# Patient Record
Sex: FEMALE | Race: WHITE | NOT HISPANIC OR LATINO
[De-identification: names, ages, dates, MRNs, and addresses within clinical notes are randomized per-mention and may not be internally consistent; named-entity substitution may affect disease eponyms.]

---

## 2017-01-09 ENCOUNTER — RX RENEWAL (OUTPATIENT)
Age: 27
End: 2017-01-09

## 2017-04-11 ENCOUNTER — APPOINTMENT (OUTPATIENT)
Dept: NEUROLOGY | Facility: CLINIC | Age: 27
End: 2017-04-11

## 2020-01-21 ENCOUNTER — EMERGENCY (EMERGENCY)
Facility: HOSPITAL | Age: 30
LOS: 0 days | Discharge: HOME | End: 2020-01-21
Attending: EMERGENCY MEDICINE
Payer: COMMERCIAL

## 2020-01-21 VITALS
DIASTOLIC BLOOD PRESSURE: 60 MMHG | HEART RATE: 100 BPM | RESPIRATION RATE: 18 BRPM | SYSTOLIC BLOOD PRESSURE: 99 MMHG | TEMPERATURE: 99 F | OXYGEN SATURATION: 100 %

## 2020-01-21 VITALS — WEIGHT: 199.96 LBS | HEIGHT: 67 IN

## 2020-01-21 DIAGNOSIS — R00.0 TACHYCARDIA, UNSPECIFIED: ICD-10-CM

## 2020-01-21 DIAGNOSIS — R50.9 FEVER, UNSPECIFIED: ICD-10-CM

## 2020-01-21 DIAGNOSIS — A08.4 VIRAL INTESTINAL INFECTION, UNSPECIFIED: ICD-10-CM

## 2020-01-21 DIAGNOSIS — R10.9 UNSPECIFIED ABDOMINAL PAIN: ICD-10-CM

## 2020-01-21 DIAGNOSIS — K40.90 UNILATERAL INGUINAL HERNIA, WITHOUT OBSTRUCTION OR GANGRENE, NOT SPECIFIED AS RECURRENT: ICD-10-CM

## 2020-01-21 DIAGNOSIS — R11.10 VOMITING, UNSPECIFIED: ICD-10-CM

## 2020-01-21 DIAGNOSIS — Z88.0 ALLERGY STATUS TO PENICILLIN: ICD-10-CM

## 2020-01-21 LAB
ALBUMIN SERPL ELPH-MCNC: 3.4 G/DL — SIGNIFICANT CHANGE UP (ref 3.3–5)
ALP SERPL-CCNC: 84 U/L — SIGNIFICANT CHANGE UP (ref 40–120)
ALT FLD-CCNC: 25 U/L — SIGNIFICANT CHANGE UP (ref 12–78)
ANION GAP SERPL CALC-SCNC: 7 MMOL/L — SIGNIFICANT CHANGE UP (ref 5–17)
APPEARANCE UR: CLEAR — SIGNIFICANT CHANGE UP
APTT BLD: 32 SEC — SIGNIFICANT CHANGE UP (ref 27.5–36.3)
AST SERPL-CCNC: 12 U/L — LOW (ref 15–37)
BASOPHILS # BLD AUTO: 0 K/UL — SIGNIFICANT CHANGE UP (ref 0–0.2)
BASOPHILS NFR BLD AUTO: 0 % — SIGNIFICANT CHANGE UP (ref 0–2)
BILIRUB SERPL-MCNC: 0.7 MG/DL — SIGNIFICANT CHANGE UP (ref 0.2–1.2)
BILIRUB UR-MCNC: NEGATIVE — SIGNIFICANT CHANGE UP
BUN SERPL-MCNC: 16 MG/DL — SIGNIFICANT CHANGE UP (ref 7–23)
CALCIUM SERPL-MCNC: 8.3 MG/DL — LOW (ref 8.5–10.1)
CHLORIDE SERPL-SCNC: 102 MMOL/L — SIGNIFICANT CHANGE UP (ref 96–108)
CO2 SERPL-SCNC: 27 MMOL/L — SIGNIFICANT CHANGE UP (ref 22–31)
COLOR SPEC: YELLOW — SIGNIFICANT CHANGE UP
CREAT SERPL-MCNC: 1.11 MG/DL — SIGNIFICANT CHANGE UP (ref 0.5–1.3)
DIFF PNL FLD: NEGATIVE — SIGNIFICANT CHANGE UP
EOSINOPHIL # BLD AUTO: 0 K/UL — SIGNIFICANT CHANGE UP (ref 0–0.5)
EOSINOPHIL NFR BLD AUTO: 0 % — SIGNIFICANT CHANGE UP (ref 0–6)
GLUCOSE SERPL-MCNC: 123 MG/DL — HIGH (ref 70–99)
GLUCOSE UR QL: NEGATIVE MG/DL — SIGNIFICANT CHANGE UP
HCG SERPL-ACNC: <1 MIU/ML — SIGNIFICANT CHANGE UP
HCT VFR BLD CALC: 38.2 % — SIGNIFICANT CHANGE UP (ref 34.5–45)
HGB BLD-MCNC: 12.5 G/DL — SIGNIFICANT CHANGE UP (ref 11.5–15.5)
INR BLD: 1.16 RATIO — SIGNIFICANT CHANGE UP (ref 0.88–1.16)
KETONES UR-MCNC: ABNORMAL
LACTATE SERPL-SCNC: 3.1 MMOL/L — HIGH (ref 0.7–2)
LEUKOCYTE ESTERASE UR-ACNC: NEGATIVE — SIGNIFICANT CHANGE UP
LYMPHOCYTES # BLD AUTO: 0.18 K/UL — LOW (ref 1–3.3)
LYMPHOCYTES # BLD AUTO: 2 % — LOW (ref 13–44)
MCHC RBC-ENTMCNC: 28.2 PG — SIGNIFICANT CHANGE UP (ref 27–34)
MCHC RBC-ENTMCNC: 32.7 GM/DL — SIGNIFICANT CHANGE UP (ref 32–36)
MCV RBC AUTO: 86.2 FL — SIGNIFICANT CHANGE UP (ref 80–100)
MONOCYTES # BLD AUTO: 0.09 K/UL — SIGNIFICANT CHANGE UP (ref 0–0.9)
MONOCYTES NFR BLD AUTO: 1 % — LOW (ref 2–14)
NEUTROPHILS # BLD AUTO: 8.59 K/UL — HIGH (ref 1.8–7.4)
NEUTROPHILS NFR BLD AUTO: 90 % — HIGH (ref 43–77)
NITRITE UR-MCNC: NEGATIVE — SIGNIFICANT CHANGE UP
NRBC # BLD: SIGNIFICANT CHANGE UP /100 WBCS (ref 0–0)
PH UR: 6 — SIGNIFICANT CHANGE UP (ref 5–8)
PLATELET # BLD AUTO: 159 K/UL — SIGNIFICANT CHANGE UP (ref 150–400)
POTASSIUM SERPL-MCNC: 3.7 MMOL/L — SIGNIFICANT CHANGE UP (ref 3.5–5.3)
POTASSIUM SERPL-SCNC: 3.7 MMOL/L — SIGNIFICANT CHANGE UP (ref 3.5–5.3)
PROT SERPL-MCNC: 7.4 GM/DL — SIGNIFICANT CHANGE UP (ref 6–8.3)
PROT UR-MCNC: 15 MG/DL
PROTHROM AB SERPL-ACNC: 12.9 SEC — SIGNIFICANT CHANGE UP (ref 10–12.9)
RBC # BLD: 4.43 M/UL — SIGNIFICANT CHANGE UP (ref 3.8–5.2)
RBC # FLD: 13 % — SIGNIFICANT CHANGE UP (ref 10.3–14.5)
SODIUM SERPL-SCNC: 136 MMOL/L — SIGNIFICANT CHANGE UP (ref 135–145)
SP GR SPEC: 1.02 — SIGNIFICANT CHANGE UP (ref 1.01–1.02)
UROBILINOGEN FLD QL: NEGATIVE MG/DL — SIGNIFICANT CHANGE UP
WBC # BLD: 8.95 K/UL — SIGNIFICANT CHANGE UP (ref 3.8–10.5)
WBC # FLD AUTO: 8.95 K/UL — SIGNIFICANT CHANGE UP (ref 3.8–10.5)

## 2020-01-21 PROCEDURE — 36415 COLL VENOUS BLD VENIPUNCTURE: CPT

## 2020-01-21 PROCEDURE — 71046 X-RAY EXAM CHEST 2 VIEWS: CPT

## 2020-01-21 PROCEDURE — 87086 URINE CULTURE/COLONY COUNT: CPT

## 2020-01-21 PROCEDURE — 81001 URINALYSIS AUTO W/SCOPE: CPT

## 2020-01-21 PROCEDURE — 99284 EMERGENCY DEPT VISIT MOD MDM: CPT | Mod: 25

## 2020-01-21 PROCEDURE — 71046 X-RAY EXAM CHEST 2 VIEWS: CPT | Mod: 26

## 2020-01-21 PROCEDURE — 93010 ELECTROCARDIOGRAM REPORT: CPT

## 2020-01-21 PROCEDURE — 74177 CT ABD & PELVIS W/CONTRAST: CPT | Mod: 26

## 2020-01-21 PROCEDURE — 85025 COMPLETE CBC W/AUTO DIFF WBC: CPT

## 2020-01-21 PROCEDURE — 99284 EMERGENCY DEPT VISIT MOD MDM: CPT

## 2020-01-21 PROCEDURE — 96374 THER/PROPH/DIAG INJ IV PUSH: CPT

## 2020-01-21 PROCEDURE — 83605 ASSAY OF LACTIC ACID: CPT

## 2020-01-21 PROCEDURE — 80053 COMPREHEN METABOLIC PANEL: CPT

## 2020-01-21 PROCEDURE — 85730 THROMBOPLASTIN TIME PARTIAL: CPT

## 2020-01-21 PROCEDURE — 96375 TX/PRO/DX INJ NEW DRUG ADDON: CPT

## 2020-01-21 PROCEDURE — 84702 CHORIONIC GONADOTROPIN TEST: CPT

## 2020-01-21 PROCEDURE — 93005 ELECTROCARDIOGRAM TRACING: CPT

## 2020-01-21 PROCEDURE — 74177 CT ABD & PELVIS W/CONTRAST: CPT

## 2020-01-21 PROCEDURE — 87040 BLOOD CULTURE FOR BACTERIA: CPT

## 2020-01-21 PROCEDURE — 85610 PROTHROMBIN TIME: CPT

## 2020-01-21 RX ORDER — ACETAMINOPHEN 500 MG
1000 TABLET ORAL ONCE
Refills: 0 | Status: COMPLETED | OUTPATIENT
Start: 2020-01-21 | End: 2020-01-21

## 2020-01-21 RX ORDER — SODIUM CHLORIDE 9 MG/ML
2500 INJECTION INTRAMUSCULAR; INTRAVENOUS; SUBCUTANEOUS ONCE
Refills: 0 | Status: COMPLETED | OUTPATIENT
Start: 2020-01-21 | End: 2020-01-21

## 2020-01-21 RX ORDER — KETOROLAC TROMETHAMINE 30 MG/ML
15 SYRINGE (ML) INJECTION ONCE
Refills: 0 | Status: DISCONTINUED | OUTPATIENT
Start: 2020-01-21 | End: 2020-01-21

## 2020-01-21 RX ORDER — ONDANSETRON 8 MG/1
4 TABLET, FILM COATED ORAL ONCE
Refills: 0 | Status: COMPLETED | OUTPATIENT
Start: 2020-01-21 | End: 2020-01-21

## 2020-01-21 RX ADMIN — Medication 15 MILLIGRAM(S): at 20:39

## 2020-01-21 RX ADMIN — Medication 1000 MILLIGRAM(S): at 20:06

## 2020-01-21 RX ADMIN — ONDANSETRON 4 MILLIGRAM(S): 8 TABLET, FILM COATED ORAL at 20:06

## 2020-01-21 RX ADMIN — SODIUM CHLORIDE 2500 MILLILITER(S): 9 INJECTION INTRAMUSCULAR; INTRAVENOUS; SUBCUTANEOUS at 19:44

## 2020-01-21 NOTE — ED ADULT NURSE NOTE - CHIEF COMPLAINT QUOTE
patient presents with RLQ pain and two episodes of vomiting that started today.  Was seen at  fever 103 sent to ER to t/o wil.  unable to take her lexapro today

## 2020-01-21 NOTE — ED STATDOCS - PHYSICAL EXAMINATION
*GEN: No acute distress, well appearing; A+O x3   *HEAD: Normocephalic, Atraumatic  *EYES/NOSE: PERRL & EOMI b/l  *THROAT: airway patent, moist mucous membranes  *NECK: Neck supple, no masses  *PULMONARY: CTA b/l, symmetric breath sounds.   *CARDIAC: s1s2, regular rhythm, no Murmur  *ABDOMEN:  Non Distended, soft, no guarding, no rebound, no masses +RLQ TTP   *BACK: no CVA tenderness, Normal  spine   *EXTREMITIES: symmetric pulses, 2+ dp & radial pulses, capillary refill < 2 seconds, no cyanosis, no edema   *SKIN: no rash or bruising   *NEUROLOGIC: alert, CN 2-12 intact, moves all 4 extremities, full active & passive ROM in all extremities, normal baseline gait  *PSYCH: insight and judgment nl, memory nl, affect nl, thought nl

## 2020-01-21 NOTE — ED ADULT TRIAGE NOTE - CHIEF COMPLAINT QUOTE
patient re admission for sw eval for placement patient presents with RLQ pain and two episodes of vomiting that started today.  Was seen at  fever 103 sent to ER to t/o wil.  unable to take her lexapro today

## 2020-01-21 NOTE — ED STATDOCS - CARE PROVIDER_API CALL
Adrienne More ()  Surgery  [address], [address]nd [place] [address]  Phone: [phone]  Fax: [phone]  Follow Up Time:     Abhi Chen (MD)  Internal Medicine  [address] [place] [address]  Phone: [phone]  Fax: [phone]  Follow Up Time:

## 2020-01-21 NOTE — ED STATDOCS - NS ED ROS FT
Review of Systems:  	•	CONSTITUTIONAL:  fever  	•	SKIN: no rash  	•	RESPIRATORY: no shortness of breath  	•	CARDIAC: no chest pain, no palpitations  	•	GI:   abd pain,  nausea,  vomiting, no diarrhea  	•	GENITO-URINARY:  no dysuria; no hematuria    	•	MUSCULOSKELETAL:  no back pain  	•	NEUROLOGIC: no weakness  	•	ALLERGY: no rhinitis  	•	PSYSCHIATRIC: no anxiety

## 2020-01-21 NOTE — ED ADULT NURSE NOTE - NSIMPLEMENTINTERV_GEN_ALL_ED
Implemented All Universal Safety Interventions:  [place] to call system. Call bell, personal items and telephone within reach. Instruct patient to call for assistance. Room bathroom lighting operational. Non-slip footwear when patient is off stretcher. Physically safe environment: no spills, clutter or unnecessary equipment. Stretcher in lowest position, wheels locked, appropriate side rails in place.

## 2020-01-21 NOTE — ED STATDOCS - CLINICAL SUMMARY MEDICAL DECISION MAKING FREE TEXT BOX
Symptoms are consistent with gastroenteritis, however due to RLQ TTP will get CT A/P to r/u appendicitis. Patient is appropriately tachycardiac due to her fever, do not suspect sepsis at initial assessment

## 2020-01-21 NOTE — ED STATDOCS - PROGRESS NOTE DETAILS
28 y/o Female presents to the ED with family.  C/o waking up today with nausea, Vomiting x 3, last episode was 11am.  Followed by bodyaches.  Pt went to Urgent Care and abdomen was tender on exam there.  Was referred to ED for further eval.  In intake, pt was well appearing, but Temp was 103.3, HR was 140.  Oropharynx pink s lesions.  CTA B. Tachycardia.  Abd: Active Bs x4, Soft, (+) RLQ  tenderness.  Labs/U/A/ CT ordered.  High dose IVF ordered. Initial Lactate as 3.1.  Pt has received 2 liters of IVF.  WBC not elevated, but left shift in Neutrophils.  Glucose 123, otherwise CMP WNL.  U/A with Trace ketones, mod epithelial cells.  Re-exam:  Abd: Soft, (+) RLQ tenderness.  IV Abx not ordered due to possible Viral Gastroenteritis source.  Georgina Alberto PA-C CT scan with normal appearing appendix tracking into inguinal hernia.  Othwerwise, CT WNL.  Re-examined pt.  Vitals improved.  Feeling much better per pt.  Abd: round, Active Bs x4, Soft, Neg bulge in groin area / RLQ.  Area soft, Neg tenderness to R inguinal canal. No masses that are not reducible.  Will refer to Surgery as outpt.  Neg evidence of incarceration or strangulation in R inguinal hernia currently.     U/A without evidence of infection.  Repeat Lactate was 1.  Will dc patient home.  F/U with PMD amnd surgery.  Cont. Motrin / tylenol, fluids, rest.  Georgina Alberto PA-C CT scan with normal appearing appendix tracking into inguinal hernia.  Othwerwise, CT WNL.  Re-examined pt.  Vitals improved.  Feeling much better per pt.  Abd: round, Active Bs x4, Soft, Neg bulge in groin area / RLQ.  Area soft, Neg tenderness to R inguinal canal. No masses that are not reducible.  Will refer to Surgery as outpt.  Neg evidence of incarceration or strangulation in R inguinal hernia currently.     U/A without evidence of infection.  Repeat Lactate was 1.  Flu swab was negative at  today.  Will dc patient home.  F/U with PMD and surgery.  Cont. Motrin / tylenol, fluids, rest.  Georgina Alberto PA-C

## 2020-01-21 NOTE — ED STATDOCS - PATIENT PORTAL LINK FT
You can access the FollowMyHealth Patient Portal offered by [facility] by registering at the following website: [email or web address]. By joining [employer]’s FollowMyHealth portal, you will also be able to view your health information using other applications (apps) compatible with our system.

## 2020-01-21 NOTE — ED STATDOCS - CARE PROVIDERS DIRECT ADDRESSES
[email or web address]

## 2020-01-21 NOTE — ED STATDOCS - ATTENDING CONTRIBUTION TO CARE
I, Jonh Campo MD,  performed the initial face to face bedside interview with this patient regarding history of present illness, review of symptoms and relevant past medical, social and family history.  I completed an independent physical examination.  I was the initial provider who evaluated this patient. I have signed out the follow up of any pending tests (i.e. labs, radiological studies) to the ACP.  I have communicated the patient’s plan of care and disposition with the ACP.

## 2020-01-21 NOTE — ED STATDOCS - OBJECTIVE STATEMENT
Pertinent HPI/PMH/PSH/FHx/SHx and Review of Systems contained within  HPI: 30 yo F p/w CC N/V. Patient reports that she woke up this morning and was vomiting that was shortly followed by diffuse pain. Patient was seen at  and told patient to come to the ED after she was found to be febrile, and RLQ TTP and wants to r/u appendicitis. States that she did not take anything for the pain this morning,   PMH/PSH relevant for:  ROS negative for: fever, Chest pain, SOB, diarrhea, dysuria    FamilyHx and SocialHx not otherwise contributory Pertinent HPI/PMH/PSH/FHx/SHx and Review of Systems contained within  HPI: 30 yo F p/w CC N/V. Patient reports that she woke up this morning and was vomiting that was shortly followed by diffuse whole body pain. Patient was seen at  and told patient to come to the ED after she was found to be febrile, had RLQ TTP only on palpation & to r/u appendicitis. States that she did not take anything for the pain this morning, & didn't have abdominal pain till palpation  PMH/PSH relevant for: none  ROS negative for: fever, Chest pain, SOB, diarrhea, dysuria    FamilyHx and SocialHx not otherwise contributory

## 2020-01-21 NOTE — ED STATDOCS - SECONDARY DIAGNOSIS.
Unilateral inguinal hernia without obstruction or gangrene, recurrence not specified Fever Tachycardia

## 2020-01-21 NOTE — ED STATDOCS - CARE PLAN
Principal Discharge DX:	Viral gastroenteritis Principal Discharge DX:	Viral gastroenteritis  Secondary Diagnosis:	Unilateral inguinal hernia without obstruction or gangrene, recurrence not specified Principal Discharge DX:	Viral gastroenteritis  Secondary Diagnosis:	Unilateral inguinal hernia without obstruction or gangrene, recurrence not specified  Secondary Diagnosis:	Fever  Secondary Diagnosis:	Tachycardia

## 2020-01-22 LAB
CULTURE RESULTS: SIGNIFICANT CHANGE UP
SPECIMEN SOURCE: SIGNIFICANT CHANGE UP

## 2020-01-27 LAB
CULTURE RESULTS: SIGNIFICANT CHANGE UP
CULTURE RESULTS: SIGNIFICANT CHANGE UP
SPECIMEN SOURCE: SIGNIFICANT CHANGE UP
SPECIMEN SOURCE: SIGNIFICANT CHANGE UP

## 2020-02-03 ENCOUNTER — APPOINTMENT (OUTPATIENT)
Dept: SURGERY | Facility: CLINIC | Age: 30
End: 2020-02-03
Payer: COMMERCIAL

## 2020-02-03 VITALS
TEMPERATURE: 97.6 F | DIASTOLIC BLOOD PRESSURE: 69 MMHG | WEIGHT: 199.44 LBS | HEIGHT: 67 IN | OXYGEN SATURATION: 100 % | SYSTOLIC BLOOD PRESSURE: 101 MMHG | BODY MASS INDEX: 31.3 KG/M2 | HEART RATE: 79 BPM

## 2020-02-03 PROCEDURE — 99202 OFFICE O/P NEW SF 15 MIN: CPT

## 2020-02-03 NOTE — PLAN
[FreeTextEntry1] : 30 yo female with right groin hernia. \par -Open right inguinal hernia with resorbable mesh. All risk, benefit, alternatives explained and the patient expressed full understanding.

## 2020-02-03 NOTE — PHYSICAL EXAM
[JVD] : no jugular venous distention  [Normal Breath Sounds] : Normal breath sounds [Normal Heart Sounds] : normal heart sounds [Abdominal Masses] : No abdominal masses [Abdomen Tenderness] : ~T ~M No abdominal tenderness [Tender] : was nontender [Enlarged] : not enlarged [No Rash or Lesion] : No rash or lesion [Alert] : alert [Oriented to Person] : oriented to person [Oriented to Place] : oriented to place [Oriented to Time] : oriented to time [Calm] : calm [de-identified] : normal [de-identified] : alert and oriented x 3 [de-identified] : soft, nt, nd [de-identified] : no large inguinal hernia on the right

## 2020-02-03 NOTE — HISTORY OF PRESENT ILLNESS
[de-identified] : 28 yo female who presents to the office for evaluation of right groin hernia. She was recently seen in the emergency room where she was evaluated for abdominal pain. She was diagnosed with right inguinal hernia. She states that she has been having right groin discomfort over the past few months. She denied any obstructive symptoms.

## 2020-05-27 ENCOUNTER — APPOINTMENT (OUTPATIENT)
Dept: SURGERY | Facility: HOSPITAL | Age: 30
End: 2020-05-27

## 2020-07-13 ENCOUNTER — RESULT REVIEW (OUTPATIENT)
Age: 30
End: 2020-07-13

## 2020-07-13 ENCOUNTER — APPOINTMENT (OUTPATIENT)
Dept: ULTRASOUND IMAGING | Facility: CLINIC | Age: 30
End: 2020-07-13
Payer: COMMERCIAL

## 2020-07-13 ENCOUNTER — OUTPATIENT (OUTPATIENT)
Dept: OUTPATIENT SERVICES | Facility: HOSPITAL | Age: 30
LOS: 1 days | End: 2020-07-13
Payer: COMMERCIAL

## 2020-07-13 DIAGNOSIS — Z00.8 ENCOUNTER FOR OTHER GENERAL EXAMINATION: ICD-10-CM

## 2020-07-13 PROCEDURE — 76882 US LMTD JT/FCL EVL NVASC XTR: CPT

## 2020-07-13 PROCEDURE — 76882 US LMTD JT/FCL EVL NVASC XTR: CPT | Mod: 26,LT

## 2020-07-27 ENCOUNTER — APPOINTMENT (OUTPATIENT)
Dept: SURGERY | Facility: CLINIC | Age: 30
End: 2020-07-27
Payer: COMMERCIAL

## 2020-07-27 ENCOUNTER — APPOINTMENT (OUTPATIENT)
Dept: OBGYN | Facility: CLINIC | Age: 30
End: 2020-07-27
Payer: COMMERCIAL

## 2020-07-27 ENCOUNTER — TRANSCRIPTION ENCOUNTER (OUTPATIENT)
Age: 30
End: 2020-07-27

## 2020-07-27 VITALS
WEIGHT: 200 LBS | BODY MASS INDEX: 31.39 KG/M2 | SYSTOLIC BLOOD PRESSURE: 100 MMHG | HEIGHT: 67 IN | HEART RATE: 79 BPM | DIASTOLIC BLOOD PRESSURE: 70 MMHG | OXYGEN SATURATION: 98 % | TEMPERATURE: 97.2 F

## 2020-07-27 VITALS
HEIGHT: 67 IN | OXYGEN SATURATION: 98 % | DIASTOLIC BLOOD PRESSURE: 68 MMHG | TEMPERATURE: 97.7 F | WEIGHT: 200 LBS | BODY MASS INDEX: 31.39 KG/M2 | HEART RATE: 110 BPM | SYSTOLIC BLOOD PRESSURE: 96 MMHG

## 2020-07-27 DIAGNOSIS — Z83.3 FAMILY HISTORY OF DIABETES MELLITUS: ICD-10-CM

## 2020-07-27 DIAGNOSIS — N92.0 EXCESSIVE AND FREQUENT MENSTRUATION WITH REGULAR CYCLE: ICD-10-CM

## 2020-07-27 DIAGNOSIS — Z78.9 OTHER SPECIFIED HEALTH STATUS: ICD-10-CM

## 2020-07-27 DIAGNOSIS — Z82.49 FAMILY HISTORY OF ISCHEMIC HEART DISEASE AND OTHER DISEASES OF THE CIRCULATORY SYSTEM: ICD-10-CM

## 2020-07-27 PROCEDURE — 99203 OFFICE O/P NEW LOW 30 MIN: CPT

## 2020-07-27 PROCEDURE — 99211 OFF/OP EST MAY X REQ PHY/QHP: CPT

## 2020-07-27 NOTE — HISTORY OF PRESENT ILLNESS
[de-identified] : This is a known patient to me who has right inguinal hernia who presents for evaluation of left sided groin pain. She called few weeks ago stating that the pain she had on the left was similar to the one she had on her right side and we worked up the area with ultrasound. The US revealed no evidence of hernia. On exam, there is no evidence of hernia as well.  I advised the patient that there is no  hernia on the left and that since the right side is not bothering  her, we can observe. If at any point, the hernia gets symptomatic, we will need to proceed with repair of the hernia. All questions were answered in detail and the patient expressed full understanding.

## 2020-07-27 NOTE — COUNSELING
[Breast Self Exam] : breast self exam [Nutrition] : nutrition [Exercise] : exercise [Vitamins/Supplements] : vitamins/supplements [Preconception Care] : preconception care [STD (testing, results, tx)] : STD (testing, results, tx) [Medication Management] : medication management [Pain Management] : pain management

## 2020-07-27 NOTE — CHIEF COMPLAINT
[Initial Visit] : initial GYN visit [FreeTextEntry1] : Pt here today bec a TVS reported poss PCOS---pt has no clinical history to suggest this. Menses monthly and always regular..Severe LLQ pain 2 wks prior to menses x1--spont resolved. CT nl gyn findings. Pt does suffer with excessive menses and dysmen that improves with OCP but interested in conceiving ,but stopped 2 yrs ago and not actively trying\par pap 2019\par pt seen by gyn at [facility] and here today for 2nd opinion

## 2020-08-19 ENCOUNTER — APPOINTMENT (OUTPATIENT)
Dept: OBGYN | Facility: CLINIC | Age: 30
End: 2020-08-19

## 2020-11-05 ENCOUNTER — APPOINTMENT (OUTPATIENT)
Dept: SURGERY | Facility: CLINIC | Age: 30
End: 2020-11-05
Payer: COMMERCIAL

## 2020-11-05 VITALS — SYSTOLIC BLOOD PRESSURE: 121 MMHG | OXYGEN SATURATION: 97 % | HEART RATE: 91 BPM | DIASTOLIC BLOOD PRESSURE: 78 MMHG

## 2020-11-05 VITALS — BODY MASS INDEX: 32.33 KG/M2 | WEIGHT: 206 LBS | TEMPERATURE: 97.8 F | HEIGHT: 67 IN

## 2020-11-05 PROCEDURE — 99072 ADDL SUPL MATRL&STAF TM PHE: CPT

## 2020-11-05 PROCEDURE — 99211 OFF/OP EST MAY X REQ PHY/QHP: CPT

## 2020-11-05 NOTE — HISTORY OF PRESENT ILLNESS
[de-identified] : The patient was seen and examined. She is for repair of right inguinal hernia. She had some question about repair approach and wanted to discuss before we proceeded. She will like to undergo the repair with Phasix mesh. The patient is agreeable to proceed with the procedure. All questions were answered and the patient expressed full understanding.

## 2020-11-11 ENCOUNTER — OUTPATIENT (OUTPATIENT)
Dept: OUTPATIENT SERVICES | Facility: HOSPITAL | Age: 30
LOS: 1 days | Discharge: HOME | End: 2020-11-11

## 2020-11-11 VITALS
DIASTOLIC BLOOD PRESSURE: 64 MMHG | TEMPERATURE: 98 F | HEIGHT: 67 IN | WEIGHT: 202.83 LBS | HEART RATE: 77 BPM | RESPIRATION RATE: 18 BRPM | OXYGEN SATURATION: 99 % | SYSTOLIC BLOOD PRESSURE: 102 MMHG

## 2020-11-11 DIAGNOSIS — Z01.818 ENCOUNTER FOR OTHER PREPROCEDURAL EXAMINATION: ICD-10-CM

## 2020-11-11 DIAGNOSIS — K40.90 UNILATERAL INGUINAL HERNIA, WITHOUT OBSTRUCTION OR GANGRENE, NOT SPECIFIED AS RECURRENT: ICD-10-CM

## 2020-11-11 DIAGNOSIS — Z87.19 PERSONAL HISTORY OF OTHER DISEASES OF THE DIGESTIVE SYSTEM: ICD-10-CM

## 2020-11-11 DIAGNOSIS — E28.2 POLYCYSTIC OVARIAN SYNDROME: ICD-10-CM

## 2020-11-11 LAB
ANION GAP SERPL CALC-SCNC: 5 MMOL/L — SIGNIFICANT CHANGE UP (ref 5–17)
APTT BLD: 35.6 SEC — HIGH (ref 27.5–35.5)
BUN SERPL-MCNC: 13 MG/DL — SIGNIFICANT CHANGE UP (ref 7–23)
CALCIUM SERPL-MCNC: 9.3 MG/DL — SIGNIFICANT CHANGE UP (ref 8.5–10.1)
CHLORIDE SERPL-SCNC: 107 MMOL/L — SIGNIFICANT CHANGE UP (ref 96–108)
CO2 SERPL-SCNC: 27 MMOL/L — SIGNIFICANT CHANGE UP (ref 22–31)
CREAT SERPL-MCNC: 0.79 MG/DL — SIGNIFICANT CHANGE UP (ref 0.5–1.3)
GLUCOSE SERPL-MCNC: 87 MG/DL — SIGNIFICANT CHANGE UP (ref 70–99)
HCG UR QL: NEGATIVE — SIGNIFICANT CHANGE UP
HCT VFR BLD CALC: 38.4 % — SIGNIFICANT CHANGE UP (ref 34.5–45)
HGB BLD-MCNC: 12 G/DL — SIGNIFICANT CHANGE UP (ref 11.5–15.5)
INR BLD: 0.99 RATIO — SIGNIFICANT CHANGE UP (ref 0.88–1.16)
MCHC RBC-ENTMCNC: 26.5 PG — LOW (ref 27–34)
MCHC RBC-ENTMCNC: 31.3 GM/DL — LOW (ref 32–36)
MCV RBC AUTO: 85 FL — SIGNIFICANT CHANGE UP (ref 80–100)
NRBC # BLD: 0 /100 WBCS — SIGNIFICANT CHANGE UP (ref 0–0)
PLATELET # BLD AUTO: 224 K/UL — SIGNIFICANT CHANGE UP (ref 150–400)
POTASSIUM SERPL-MCNC: 4.1 MMOL/L — SIGNIFICANT CHANGE UP (ref 3.5–5.3)
POTASSIUM SERPL-SCNC: 4.1 MMOL/L — SIGNIFICANT CHANGE UP (ref 3.5–5.3)
PROTHROM AB SERPL-ACNC: 11.5 SEC — SIGNIFICANT CHANGE UP (ref 10.6–13.6)
RBC # BLD: 4.52 M/UL — SIGNIFICANT CHANGE UP (ref 3.8–5.2)
RBC # FLD: 13.6 % — SIGNIFICANT CHANGE UP (ref 10.3–14.5)
SODIUM SERPL-SCNC: 139 MMOL/L — SIGNIFICANT CHANGE UP (ref 135–145)
WBC # BLD: 6.55 K/UL — SIGNIFICANT CHANGE UP (ref 3.8–10.5)
WBC # FLD AUTO: 6.55 K/UL — SIGNIFICANT CHANGE UP (ref 3.8–10.5)

## 2020-11-11 NOTE — H&P PST ADULT - NSANTHOSAYNRD_GEN_A_CORE
No. CAMILLE screening performed.  STOP BANG Legend: 0-2 = LOW Risk; 3-4 = INTERMEDIATE Risk; 5-8 = HIGH Risk

## 2020-11-11 NOTE — H&P PST ADULT - HISTORY OF PRESENT ILLNESS
30F pmh PCOS, IBS c/o right abdominal pain found to have inguinal hernia here for PST for scheduled Laparoscopic right inguinal hernia repair with mesh possible open  This patient denies any fever, cough, sob, flu like symptoms or travel outside of the [place] in the past 30 days

## 2020-11-11 NOTE — H&P PST ADULT - NSICDXPROBLEM_GEN_ALL_CORE_FT
PROBLEM DIAGNOSES  Problem: History of IBS  Assessment and Plan: Continue current regimen     Problem: PCOS (polycystic ovarian syndrome)  Assessment and Plan: Continue current regimen and medications.

## 2020-11-11 NOTE — H&P PST ADULT - ASSESSMENT
30F pmh PCOS, IBS c/o right abdominal pain found to have inguinal hernia here for PST for scheduled Laparoscopic right inguinal hernia repair with mesh possible open  CAPRINI SCORE    AGE RELATED RISK FACTORS                                                       MOBILITY RELATED FACTORS  [ ] Age 41-60 years                                            (1 Point)                  [ ] Bed rest                                                        (1 Point)  [ ] Age: 61-74 years                                           (2 Points)                [ ] Plaster cast                                                   (2 Points)  [ ] Age= 75 years                                              (3 Points)                 [ ] Bed bound for more than 72 hours                   (2 Points)    DISEASE RELATED RISK FACTORS                                               GENDER SPECIFIC FACTORS  [ ] Edema in the lower extremities                       (1 Point)                  [ ] Pregnancy                                                     (1 Point)  [ ] Varicose veins                                               (1 Point)                  [ ] Post-partum < 6 weeks                                   (1 Point)             [x ] BMI > 25 Kg/m2                                            (1 Point)                  [ ] Hormonal therapy  or oral contraception            (1 Point)                 [ ] Sepsis (in the previous month)                        (1 Point)                  [ ] History of pregnancy complications  [ ] Pneumonia or serious lung disease                                               [ ] Unexplained or recurrent                       (1 Point)           (in the previous month)                               (1 Point)  [ ] Abnormal pulmonary function test                     (1 Point)                 SURGERY RELATED RISK FACTORS  [ ] Acute myocardial infarction                              (1 Point)                 [ ]  Section                                            (1 Point)  [ ] Congestive heart failure (in the previous month)  (1 Point)                 [ ] Minor surgery                                                 (1 Point)   [ ] Inflammatory bowel disease                             (1 Point)                 [ ] Arthroscopic surgery                                        (2 Points)  [ ] Central venous access                                    (2 Points)                [x ] General surgery lasting more than 45 minutes   (2 Points)       [ ] Stroke (in the previous month)                          (5 Points)               [ ] Elective arthroplasty                                        (5 Points)                                                                                                                                               HEMATOLOGY RELATED FACTORS                                                 TRAUMA RELATED RISK FACTORS  [ ] Prior episodes of VTE                                     (3 Points)                 [ ] Fracture of the hip, pelvis, or leg                       (5 Points)  [ ] Positive family history for VTE                         (3 Points)                 [ ] Acute spinal cord injury (in the previous month)  (5 Points)  [ ] Prothrombin 60705 A                                      (3 Points)                 [ ] Paralysis  (less than 1 month)                          (5 Points)  [ ] Factor V Leiden                                             (3 Points)                 [ ] Multiple Trauma within 1 month                         (5 Points)  [ ] Lupus anticoagulants                                     (3 Points)                                                           [ ] Anticardiolipin antibodies                                (3 Points)                                                       [ ] High homocysteine in the blood                      (3 Points)                                             [ ] Other congenital or acquired thrombophilia       (3 Points)                                                [ ] Heparin induced thrombocytopenia                  (3 Points)                                          Total Score [      3    ]

## 2020-11-12 PROBLEM — Z87.19 PERSONAL HISTORY OF OTHER DISEASES OF THE DIGESTIVE SYSTEM: Chronic | Status: ACTIVE | Noted: 2020-11-11

## 2020-11-12 PROBLEM — Z87.42 PERSONAL HISTORY OF OTHER DISEASES OF THE FEMALE GENITAL TRACT: Chronic | Status: ACTIVE | Noted: 2020-11-11

## 2020-11-22 ENCOUNTER — APPOINTMENT (OUTPATIENT)
Dept: DISASTER EMERGENCY | Facility: CLINIC | Age: 30
End: 2020-11-22

## 2020-11-23 LAB — SARS-COV-2 N GENE NPH QL NAA+PROBE: NOT DETECTED

## 2020-11-24 ENCOUNTER — TRANSCRIPTION ENCOUNTER (OUTPATIENT)
Age: 30
End: 2020-11-24

## 2020-11-25 ENCOUNTER — OUTPATIENT (OUTPATIENT)
Dept: OUTPATIENT SERVICES | Facility: HOSPITAL | Age: 30
LOS: 1 days | Discharge: HOME | End: 2020-11-25
Payer: COMMERCIAL

## 2020-11-25 ENCOUNTER — APPOINTMENT (OUTPATIENT)
Dept: SURGERY | Facility: HOSPITAL | Age: 30
End: 2020-11-25

## 2020-11-25 VITALS
SYSTOLIC BLOOD PRESSURE: 107 MMHG | HEART RATE: 57 BPM | OXYGEN SATURATION: 100 % | DIASTOLIC BLOOD PRESSURE: 51 MMHG | RESPIRATION RATE: 13 BRPM

## 2020-11-25 VITALS
WEIGHT: 199.96 LBS | HEIGHT: 67 IN | TEMPERATURE: 98 F | OXYGEN SATURATION: 98 % | DIASTOLIC BLOOD PRESSURE: 64 MMHG | HEART RATE: 82 BPM | SYSTOLIC BLOOD PRESSURE: 103 MMHG | RESPIRATION RATE: 16 BRPM

## 2020-11-25 DIAGNOSIS — K40.90 UNILATERAL INGUINAL HERNIA, WITHOUT OBSTRUCTION OR GANGRENE, NOT SPECIFIED AS RECURRENT: ICD-10-CM

## 2020-11-25 LAB
GLUCOSE BLDC GLUCOMTR-MCNC: 100 MG/DL — HIGH (ref 70–99)
HCG UR QL: NEGATIVE — SIGNIFICANT CHANGE UP

## 2020-11-25 PROCEDURE — 49505 PRP I/HERN INIT REDUC >5 YR: CPT

## 2020-11-25 RX ORDER — FENTANYL CITRATE 50 UG/ML
50 INJECTION INTRAVENOUS ONCE
Refills: 0 | Status: DISCONTINUED | OUTPATIENT
Start: 2020-11-25 | End: 2020-11-26

## 2020-11-25 RX ORDER — ACETAMINOPHEN 500 MG
1000 TABLET ORAL ONCE
Refills: 0 | Status: COMPLETED | OUTPATIENT
Start: 2020-11-25 | End: 2020-11-25

## 2020-11-25 RX ORDER — FENTANYL CITRATE 50 UG/ML
25 INJECTION INTRAVENOUS
Refills: 0 | Status: DISCONTINUED | OUTPATIENT
Start: 2020-11-25 | End: 2020-11-26

## 2020-11-25 RX ORDER — OXYCODONE HYDROCHLORIDE 5 MG/1
1 TABLET ORAL
Qty: 8 | Refills: 0
Start: 2020-11-25

## 2020-11-25 RX ORDER — SODIUM CHLORIDE 9 MG/ML
3 INJECTION INTRAMUSCULAR; INTRAVENOUS; SUBCUTANEOUS EVERY 8 HOURS
Refills: 0 | Status: DISCONTINUED | OUTPATIENT
Start: 2020-11-25 | End: 2020-11-25

## 2020-11-25 RX ORDER — ONDANSETRON 8 MG/1
4 TABLET, FILM COATED ORAL ONCE
Refills: 0 | Status: DISCONTINUED | OUTPATIENT
Start: 2020-11-25 | End: 2020-11-26

## 2020-11-25 RX ORDER — SODIUM CHLORIDE 9 MG/ML
1000 INJECTION, SOLUTION INTRAVENOUS
Refills: 0 | Status: DISCONTINUED | OUTPATIENT
Start: 2020-11-25 | End: 2020-11-26

## 2020-11-25 RX ADMIN — Medication 400 MILLIGRAM(S): at 12:52

## 2020-11-25 RX ADMIN — SODIUM CHLORIDE 100 MILLILITER(S): 9 INJECTION, SOLUTION INTRAVENOUS at 12:53

## 2020-11-25 NOTE — ASU DISCHARGE PLAN (ADULT/PEDIATRIC) - CARE PROVIDER_API CALL
Jojo Kinney  SURGERY  [address] [place] [address]  Phone: [phone]  Fax: [phone]  Follow Up Time:

## 2020-11-25 NOTE — ASU DISCHARGE PLAN (ADULT/PEDIATRIC) - ASU DC SPECIAL INSTRUCTIONSFT
Follow up with Dr. Kinney in 1-2 weeks. Please call to schedule an appointment.   NOTIFY YOUR SURGEON IF: You have any bleeding that does not stop, any pus draining from your wound, any fever (over 100.4 F) or chills, persistent nausea/vomiting, persistent diarrhea, or if your pain is not controlled on your discharge pain medications.    Wound: You may take off outer pink dressing and shower after 48 hours. After showering, pat steri strips dry. Leave the white steri strips in place, they will fall off on their own in approximately 5-7 days or they will be removed at your follow up appointment.

## 2020-11-25 NOTE — BRIEF OPERATIVE NOTE - NSICDXBRIEFPROCEDURE_GEN_ALL_CORE_FT
PROCEDURES:  Open repair of inguinal hernia using mesh in adult 25-Nov-2020 11:52:06  Eligio Montalvo

## 2020-12-01 DIAGNOSIS — K40.90 UNILATERAL INGUINAL HERNIA, WITHOUT OBSTRUCTION OR GANGRENE, NOT SPECIFIED AS RECURRENT: ICD-10-CM

## 2020-12-01 DIAGNOSIS — E28.2 POLYCYSTIC OVARIAN SYNDROME: ICD-10-CM

## 2020-12-01 DIAGNOSIS — Z88.0 ALLERGY STATUS TO PENICILLIN: ICD-10-CM

## 2020-12-01 DIAGNOSIS — Z79.84 LONG TERM (CURRENT) USE OF ORAL HYPOGLYCEMIC DRUGS: ICD-10-CM

## 2020-12-03 ENCOUNTER — APPOINTMENT (OUTPATIENT)
Dept: SURGERY | Facility: CLINIC | Age: 30
End: 2020-12-03
Payer: COMMERCIAL

## 2020-12-03 VITALS
SYSTOLIC BLOOD PRESSURE: 114 MMHG | HEIGHT: 67 IN | OXYGEN SATURATION: 99 % | DIASTOLIC BLOOD PRESSURE: 77 MMHG | WEIGHT: 197.19 LBS | BODY MASS INDEX: 30.95 KG/M2 | TEMPERATURE: 97.5 F | HEART RATE: 85 BPM

## 2020-12-03 PROCEDURE — 99024 POSTOP FOLLOW-UP VISIT: CPT

## 2020-12-03 NOTE — HISTORY OF PRESENT ILLNESS
[de-identified] : Pt was seen and examined. Pt is s/p repair of repair of right inguinal hernia with mesh. She states that her pain is better and the wound feels well. She was advised to keep the area clean and dry.

## 2021-01-14 ENCOUNTER — APPOINTMENT (OUTPATIENT)
Dept: SURGERY | Facility: CLINIC | Age: 31
End: 2021-01-14

## 2022-01-19 ENCOUNTER — OUTPATIENT (OUTPATIENT)
Dept: INPATIENT UNIT | Facility: HOSPITAL | Age: 32
LOS: 1 days | Discharge: HOME | End: 2022-01-19
Payer: COMMERCIAL

## 2022-01-19 DIAGNOSIS — O26.899 OTHER SPECIFIED PREGNANCY RELATED CONDITIONS, UNSPECIFIED TRIMESTER: ICD-10-CM

## 2022-01-19 PROCEDURE — G0463: CPT

## 2022-01-19 PROCEDURE — 59025 FETAL NON-STRESS TEST: CPT

## 2022-01-20 DIAGNOSIS — O26.899 OTHER SPECIFIED PREGNANCY RELATED CONDITIONS, UNSPECIFIED TRIMESTER: ICD-10-CM

## 2022-01-20 DIAGNOSIS — Z3A.00 WEEKS OF GESTATION OF PREGNANCY NOT SPECIFIED: ICD-10-CM

## 2022-01-24 ENCOUNTER — INPATIENT (INPATIENT)
Facility: HOSPITAL | Age: 32
LOS: 2 days | Discharge: HOME | End: 2022-01-27
Attending: OBSTETRICS & GYNECOLOGY | Admitting: OBSTETRICS & GYNECOLOGY
Payer: COMMERCIAL

## 2022-01-24 VITALS — HEIGHT: 67 IN | WEIGHT: 220.9 LBS

## 2022-01-24 DIAGNOSIS — E03.9 HYPOTHYROIDISM, UNSPECIFIED: ICD-10-CM

## 2022-01-24 DIAGNOSIS — E28.2 POLYCYSTIC OVARIAN SYNDROME: ICD-10-CM

## 2022-01-24 DIAGNOSIS — O32.4XX0 MATERNAL CARE FOR HIGH HEAD AT TERM, NOT APPLICABLE OR UNSPECIFIED: ICD-10-CM

## 2022-01-24 DIAGNOSIS — Z3A.39 39 WEEKS GESTATION OF PREGNANCY: ICD-10-CM

## 2022-01-24 DIAGNOSIS — D62 ACUTE POSTHEMORRHAGIC ANEMIA: ICD-10-CM

## 2022-01-24 DIAGNOSIS — O42.92 FULL-TERM PREMATURE RUPTURE OF MEMBRANES, UNSPECIFIED AS TO LENGTH OF TIME BETWEEN RUPTURE AND ONSET OF LABOR: ICD-10-CM

## 2022-01-24 LAB
BASOPHILS # BLD AUTO: 0.03 K/UL — SIGNIFICANT CHANGE UP (ref 0–0.2)
BASOPHILS NFR BLD AUTO: 0.3 % — SIGNIFICANT CHANGE UP (ref 0–2)
COVID-19 SPIKE DOMAIN AB INTERP: POSITIVE
COVID-19 SPIKE DOMAIN ANTIBODY RESULT: >250 U/ML — HIGH
EOSINOPHIL # BLD AUTO: 0.03 K/UL — SIGNIFICANT CHANGE UP (ref 0–0.5)
EOSINOPHIL NFR BLD AUTO: 0.3 % — SIGNIFICANT CHANGE UP (ref 0–6)
HCT VFR BLD CALC: 35.1 % — SIGNIFICANT CHANGE UP (ref 34.5–45)
HGB BLD-MCNC: 11.6 G/DL — SIGNIFICANT CHANGE UP (ref 11.5–15.5)
IMM GRANULOCYTES NFR BLD AUTO: 0.9 % — SIGNIFICANT CHANGE UP (ref 0–1.5)
LYMPHOCYTES # BLD AUTO: 1.58 K/UL — SIGNIFICANT CHANGE UP (ref 1–3.3)
LYMPHOCYTES # BLD AUTO: 14.3 % — SIGNIFICANT CHANGE UP (ref 13–44)
MCHC RBC-ENTMCNC: 30.2 PG — SIGNIFICANT CHANGE UP (ref 27–34)
MCHC RBC-ENTMCNC: 33 GM/DL — SIGNIFICANT CHANGE UP (ref 32–36)
MCV RBC AUTO: 91.4 FL — SIGNIFICANT CHANGE UP (ref 80–100)
MONOCYTES # BLD AUTO: 0.62 K/UL — SIGNIFICANT CHANGE UP (ref 0–0.9)
MONOCYTES NFR BLD AUTO: 5.6 % — SIGNIFICANT CHANGE UP (ref 2–14)
NEUTROPHILS # BLD AUTO: 8.72 K/UL — HIGH (ref 1.8–7.4)
NEUTROPHILS NFR BLD AUTO: 78.6 % — HIGH (ref 43–77)
PLATELET # BLD AUTO: 147 K/UL — LOW (ref 150–400)
RBC # BLD: 3.84 M/UL — SIGNIFICANT CHANGE UP (ref 3.8–5.2)
RBC # FLD: 13.7 % — SIGNIFICANT CHANGE UP (ref 10.3–14.5)
SARS-COV-2 IGG+IGM SERPL QL IA: >250 U/ML — HIGH
SARS-COV-2 IGG+IGM SERPL QL IA: POSITIVE
SARS-COV-2 RNA SPEC QL NAA+PROBE: SIGNIFICANT CHANGE UP
T PALLIDUM AB TITR SER: NEGATIVE — SIGNIFICANT CHANGE UP
WBC # BLD: 11.08 K/UL — HIGH (ref 3.8–10.5)
WBC # FLD AUTO: 11.08 K/UL — HIGH (ref 3.8–10.5)

## 2022-01-24 PROCEDURE — 85014 HEMATOCRIT: CPT

## 2022-01-24 PROCEDURE — 86901 BLOOD TYPING SEROLOGIC RH(D): CPT

## 2022-01-24 PROCEDURE — 86900 BLOOD TYPING SEROLOGIC ABO: CPT

## 2022-01-24 PROCEDURE — 86780 TREPONEMA PALLIDUM: CPT

## 2022-01-24 PROCEDURE — 94760 N-INVAS EAR/PLS OXIMETRY 1: CPT

## 2022-01-24 PROCEDURE — G0463: CPT

## 2022-01-24 PROCEDURE — 85025 COMPLETE CBC W/AUTO DIFF WBC: CPT

## 2022-01-24 PROCEDURE — 59050 FETAL MONITOR W/REPORT: CPT

## 2022-01-24 PROCEDURE — 86850 RBC ANTIBODY SCREEN: CPT

## 2022-01-24 PROCEDURE — U0005: CPT

## 2022-01-24 PROCEDURE — 85018 HEMOGLOBIN: CPT

## 2022-01-24 PROCEDURE — 36415 COLL VENOUS BLD VENIPUNCTURE: CPT

## 2022-01-24 PROCEDURE — 86769 SARS-COV-2 COVID-19 ANTIBODY: CPT

## 2022-01-24 PROCEDURE — U0003: CPT

## 2022-01-24 PROCEDURE — C1889: CPT

## 2022-01-24 RX ORDER — SODIUM CHLORIDE 9 MG/ML
1000 INJECTION, SOLUTION INTRAVENOUS
Refills: 0 | Status: DISCONTINUED | OUTPATIENT
Start: 2022-01-24 | End: 2022-01-25

## 2022-01-24 RX ORDER — AMPICILLIN TRIHYDRATE 250 MG
2 CAPSULE ORAL ONCE
Refills: 0 | Status: COMPLETED | OUTPATIENT
Start: 2022-01-24 | End: 2022-01-24

## 2022-01-24 RX ORDER — CITRIC ACID/SODIUM CITRATE 300-500 MG
15 SOLUTION, ORAL ORAL EVERY 6 HOURS
Refills: 0 | Status: DISCONTINUED | OUTPATIENT
Start: 2022-01-24 | End: 2022-01-25

## 2022-01-24 RX ORDER — OXYTOCIN 10 UNIT/ML
VIAL (ML) INJECTION
Qty: 20 | Refills: 0 | Status: DISCONTINUED | OUTPATIENT
Start: 2022-01-24 | End: 2022-01-27

## 2022-01-24 RX ORDER — AMPICILLIN TRIHYDRATE 250 MG
1 CAPSULE ORAL EVERY 4 HOURS
Refills: 0 | Status: DISCONTINUED | OUTPATIENT
Start: 2022-01-24 | End: 2022-01-25

## 2022-01-24 RX ADMIN — Medication 216 GRAM(S): at 13:08

## 2022-01-24 RX ADMIN — Medication 108 GRAM(S): at 21:05

## 2022-01-24 RX ADMIN — SODIUM CHLORIDE 125 MILLILITER(S): 9 INJECTION, SOLUTION INTRAVENOUS at 20:15

## 2022-01-24 RX ADMIN — Medication 108 GRAM(S): at 17:04

## 2022-01-24 RX ADMIN — SODIUM CHLORIDE 125 MILLILITER(S): 9 INJECTION, SOLUTION INTRAVENOUS at 13:07

## 2022-01-24 NOTE — PATIENT PROFILE OB - DOES PATIENT HAVE ADVANCE DIRECTIVE
Subjective:       Patient ID: Jason Hatchett is a 42 y.o. male.    Chief Complaint: Annual Exam    Here for routine health maintenance.    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  Benadryl nw;   Elevated lft - needs recheck and additional w/u      Review of Systems   Constitutional: Negative for activity change, appetite change, fever and unexpected weight change.   HENT: Negative for hearing loss, nosebleeds, rhinorrhea and trouble swallowing.    Eyes: Negative for discharge and visual disturbance.   Respiratory: Negative for choking, chest tightness, shortness of breath and wheezing.    Cardiovascular: Negative for chest pain and palpitations.   Gastrointestinal: Negative for abdominal pain, blood in stool, constipation, diarrhea, nausea and vomiting.   Endocrine: Negative for polydipsia and polyuria.   Genitourinary: Negative for difficulty urinating, hematuria and urgency.   Musculoskeletal: Positive for arthralgias. Negative for joint swelling and neck pain.   Skin: Negative for rash and wound.   Neurological: Negative for dizziness, syncope, weakness and headaches.   Psychiatric/Behavioral: Negative for confusion and dysphoric mood.       Objective:      Vitals:    05/01/18 1333   BP: 130/78   Pulse: 71   Temp: 98.8 °F (37.1 °C)     Physical Exam   Constitutional: He appears well-nourished.   Eyes: Conjunctivae and EOM are normal.   Neck: Trachea normal and normal range of motion. No thyromegaly present.   Cardiovascular: Normal heart sounds.    Edema negative   Pulmonary/Chest: Effort normal and breath sounds normal.   Abdominal: Soft. There is no hepatomegaly.   Musculoskeletal:   ROM normal bilateral  Strength normal bilateral   Neurological: He has normal reflexes. No cranial nerve deficit.   Skin: Skin is warm, dry and intact.   Psychiatric: He has a normal mood and affect.   Alert  and Oriented    Vitals reviewed.        Assessment:       1. Routine physical examination    2. Sleep disorder, shift work    3. Insomnia, unspecified type    4. Essential hypertension    5. Transaminitis    6. Lateral epicondylitis of both elbows        Plan:       Routine physical examination  -     TSH; Future; Expected date: 05/01/2018  -     Comprehensive metabolic panel; Future; Expected date: 05/01/2018  -     Lipid panel; Future; Expected date: 05/01/2018    Sleep disorder, shift work  -     dextroamphetamine-amphetamine (ADDERALL) 20 mg tablet; Take 1 tablet (20 mg total) by mouth once daily. As needed For sleep shift work disorder  Dispense: 60 tablet; Refill: 0    Insomnia, unspecified type    Essential hypertension  -     Lipid panel; Future; Expected date: 05/01/2018  -     lisinopril (PRINIVIL,ZESTRIL) 20 MG tablet; Take 1 tablet (20 mg total) by mouth once daily.  Dispense: 30 tablet; Refill: 11    Transaminitis  -     Ferritin; Future; Expected date: 05/01/2018  -     Iron and TIBC; Future; Expected date: 05/01/2018  -     TSH; Future; Expected date: 05/01/2018  -     Gamma GT; Future; Expected date: 05/01/2018  -     Comprehensive metabolic panel; Future; Expected date: 05/01/2018    Lateral epicondylitis of both elbows            Medication List with Changes/Refills   New Medications    DEXTROAMPHETAMINE-AMPHETAMINE (ADDERALL) 20 MG TABLET    Take 1 tablet (20 mg total) by mouth once daily. As needed For sleep shift work disorder   Current Medications    FLUTICASONE (FLONASE) 50 MCG/ACTUATION NASAL SPRAY    SPRAY 2 SPRAYS IN EACH NOSTRIL DAILY    HYDROXYZINE HCL (ATARAX) 25 MG TABLET    1 po qhs prn insomnia; may increase 1 tab per night for max of 4 tabs    MULTIVITAMIN (THERAGRAN) PER TABLET    Take 1 tablet by mouth.   Changed and/or Refilled Medications    Modified Medication Previous Medication    LISINOPRIL (PRINIVIL,ZESTRIL) 20 MG TABLET lisinopril 10 MG tablet       Take 1 tablet (20 mg  "total) by mouth once daily.    TAKE 1 TABLET BY MOUTH EVERY DAY   Discontinued Medications    AMPHETAMINE SALT COMBO 20 MG TABLET    Take 20 mg by mouth. Take takes 10mg at am and hs /depending on sched.    DEXTROAMPHETAMINE-AMPHETAMINE (ADDERALL) 20 MG TABLET    Take 1 tablet (20 mg total) by mouth once daily.    DEXTROAMPHETAMINE-AMPHETAMINE (ADDERALL) 20 MG TABLET    Take 1 tablet (20 mg total) by mouth once daily.    DEXTROAMPHETAMINE-AMPHETAMINE (ADDERALL) 20 MG TABLET    Take 1 tablet (20 mg total) by mouth once daily.    ERGOCALCIFEROL (ERGOCALCIFEROL) 50,000 UNIT CAP    TAKE ONE CAPSULE BY MOUTH EVERY 7 DAYS FOR 6 WEEKS THEN ONE CAPSULE BY MOUTH MONTHLY    FLUOXETINE (PROZAC) 20 MG TABLET    Take 1 tablet (20 mg total) by mouth once daily.    TEMAZEPAM (RESTORIL) 7.5 MG CAP    1 - 2 po before bed prn insomnia     Wellness reviewed  Try melatonin 30 mg + Atarax   Conservative, otc for elbows; rest    Continue current management    Counseled on regular exercise, maintenance of a healthy weight, balanced diet rich in fruits/vegetables and lean protein, and avoidance of unhealthy habits like smoking and excessive alcohol intake.   Also, counseled on importance of being compliant with medication, health appointments, diet and exercise.     Follow-up in about 1 year (around 5/1/2019). sooner for adderall; ok refill restoril if works; narendra    "This note will not be shared with the patient."  " No

## 2022-01-24 NOTE — PATIENT PROFILE OB - FUNCTIONAL ASSESSMENT - DAILY ACTIVITY PT AGE POP HIDDEN
Detail Level: Zone Plan: Ultherapy discussed for lower face , patient has done full face in past\\nLiposuction discussed as an option too Adult

## 2022-01-25 RX ORDER — OXYTOCIN 10 UNIT/ML
333.33 VIAL (ML) INJECTION
Qty: 20 | Refills: 0 | Status: DISCONTINUED | OUTPATIENT
Start: 2022-01-25 | End: 2022-01-27

## 2022-01-25 RX ORDER — IBUPROFEN 200 MG
600 TABLET ORAL EVERY 6 HOURS
Refills: 0 | Status: COMPLETED | OUTPATIENT
Start: 2022-01-25 | End: 2022-12-24

## 2022-01-25 RX ORDER — MAGNESIUM HYDROXIDE 400 MG/1
30 TABLET, CHEWABLE ORAL
Refills: 0 | Status: DISCONTINUED | OUTPATIENT
Start: 2022-01-25 | End: 2022-01-27

## 2022-01-25 RX ORDER — SODIUM CHLORIDE 9 MG/ML
1000 INJECTION, SOLUTION INTRAVENOUS
Refills: 0 | Status: DISCONTINUED | OUTPATIENT
Start: 2022-01-25 | End: 2022-01-27

## 2022-01-25 RX ORDER — PRAMOXINE HYDROCHLORIDE 150 MG/15G
1 AEROSOL, FOAM RECTAL EVERY 4 HOURS
Refills: 0 | Status: DISCONTINUED | OUTPATIENT
Start: 2022-01-25 | End: 2022-01-27

## 2022-01-25 RX ORDER — DIBUCAINE 1 %
1 OINTMENT (GRAM) RECTAL EVERY 6 HOURS
Refills: 0 | Status: DISCONTINUED | OUTPATIENT
Start: 2022-01-25 | End: 2022-01-27

## 2022-01-25 RX ORDER — TETANUS TOXOID, REDUCED DIPHTHERIA TOXOID AND ACELLULAR PERTUSSIS VACCINE, ADSORBED 5; 2.5; 8; 8; 2.5 [IU]/.5ML; [IU]/.5ML; UG/.5ML; UG/.5ML; UG/.5ML
0.5 SUSPENSION INTRAMUSCULAR ONCE
Refills: 0 | Status: DISCONTINUED | OUTPATIENT
Start: 2022-01-25 | End: 2022-01-27

## 2022-01-25 RX ORDER — SODIUM CHLORIDE 9 MG/ML
3 INJECTION INTRAMUSCULAR; INTRAVENOUS; SUBCUTANEOUS EVERY 8 HOURS
Refills: 0 | Status: DISCONTINUED | OUTPATIENT
Start: 2022-01-25 | End: 2022-01-26

## 2022-01-25 RX ORDER — OXYTOCIN 10 UNIT/ML
VIAL (ML) INJECTION
Qty: 30 | Refills: 0 | Status: DISCONTINUED | OUTPATIENT
Start: 2022-01-25 | End: 2022-01-25

## 2022-01-25 RX ORDER — SIMETHICONE 80 MG/1
80 TABLET, CHEWABLE ORAL EVERY 4 HOURS
Refills: 0 | Status: DISCONTINUED | OUTPATIENT
Start: 2022-01-25 | End: 2022-01-27

## 2022-01-25 RX ORDER — ACETAMINOPHEN 500 MG
975 TABLET ORAL
Refills: 0 | Status: DISCONTINUED | OUTPATIENT
Start: 2022-01-25 | End: 2022-01-27

## 2022-01-25 RX ORDER — DIPHENHYDRAMINE HCL 50 MG
25 CAPSULE ORAL EVERY 6 HOURS
Refills: 0 | Status: DISCONTINUED | OUTPATIENT
Start: 2022-01-25 | End: 2022-01-27

## 2022-01-25 RX ORDER — HYDROCORTISONE 1 %
1 OINTMENT (GRAM) TOPICAL EVERY 6 HOURS
Refills: 0 | Status: DISCONTINUED | OUTPATIENT
Start: 2022-01-25 | End: 2022-01-27

## 2022-01-25 RX ORDER — LEVOTHYROXINE SODIUM 125 MCG
50 TABLET ORAL DAILY
Refills: 0 | Status: DISCONTINUED | OUTPATIENT
Start: 2022-01-25 | End: 2022-01-27

## 2022-01-25 RX ORDER — BENZOCAINE 10 %
1 GEL (GRAM) MUCOUS MEMBRANE EVERY 6 HOURS
Refills: 0 | Status: DISCONTINUED | OUTPATIENT
Start: 2022-01-25 | End: 2022-01-27

## 2022-01-25 RX ORDER — ACETAMINOPHEN 500 MG
975 TABLET ORAL EVERY 6 HOURS
Refills: 0 | Status: DISCONTINUED | OUTPATIENT
Start: 2022-01-25 | End: 2022-01-27

## 2022-01-25 RX ORDER — AER TRAVELER 0.5 G/1
1 SOLUTION RECTAL; TOPICAL EVERY 4 HOURS
Refills: 0 | Status: DISCONTINUED | OUTPATIENT
Start: 2022-01-25 | End: 2022-01-27

## 2022-01-25 RX ORDER — IBUPROFEN 200 MG
600 TABLET ORAL EVERY 6 HOURS
Refills: 0 | Status: DISCONTINUED | OUTPATIENT
Start: 2022-01-25 | End: 2022-01-27

## 2022-01-25 RX ORDER — OXYCODONE HYDROCHLORIDE 5 MG/1
5 TABLET ORAL
Refills: 0 | Status: DISCONTINUED | OUTPATIENT
Start: 2022-01-25 | End: 2022-01-27

## 2022-01-25 RX ORDER — OXYCODONE HYDROCHLORIDE 5 MG/1
5 TABLET ORAL ONCE
Refills: 0 | Status: DISCONTINUED | OUTPATIENT
Start: 2022-01-25 | End: 2022-01-27

## 2022-01-25 RX ORDER — LANOLIN
1 OINTMENT (GRAM) TOPICAL EVERY 6 HOURS
Refills: 0 | Status: DISCONTINUED | OUTPATIENT
Start: 2022-01-25 | End: 2022-01-27

## 2022-01-25 RX ORDER — ACETAMINOPHEN 500 MG
1000 TABLET ORAL ONCE
Refills: 0 | Status: COMPLETED | OUTPATIENT
Start: 2022-01-25 | End: 2022-01-25

## 2022-01-25 RX ADMIN — SODIUM CHLORIDE 125 MILLILITER(S): 9 INJECTION, SOLUTION INTRAVENOUS at 05:53

## 2022-01-25 RX ADMIN — Medication 600 MILLIGRAM(S): at 18:19

## 2022-01-25 RX ADMIN — Medication 975 MILLIGRAM(S): at 15:47

## 2022-01-25 RX ADMIN — Medication 15 MILLILITER(S): at 06:06

## 2022-01-25 RX ADMIN — Medication 975 MILLIGRAM(S): at 21:11

## 2022-01-25 RX ADMIN — Medication 600 MILLIGRAM(S): at 13:39

## 2022-01-25 RX ADMIN — Medication 975 MILLIGRAM(S): at 15:18

## 2022-01-25 RX ADMIN — Medication 400 MILLIGRAM(S): at 10:37

## 2022-01-25 RX ADMIN — SODIUM CHLORIDE 3 MILLILITER(S): 9 INJECTION INTRAMUSCULAR; INTRAVENOUS; SUBCUTANEOUS at 15:47

## 2022-01-25 RX ADMIN — Medication 600 MILLIGRAM(S): at 18:18

## 2022-01-25 RX ADMIN — Medication 975 MILLIGRAM(S): at 22:06

## 2022-01-25 RX ADMIN — Medication 1000 MILLIUNIT(S)/MIN: at 09:02

## 2022-01-25 RX ADMIN — Medication 2 MILLIUNIT(S)/MIN: at 05:52

## 2022-01-25 RX ADMIN — Medication 108 GRAM(S): at 02:14

## 2022-01-25 RX ADMIN — SODIUM CHLORIDE 3 MILLILITER(S): 9 INJECTION INTRAMUSCULAR; INTRAVENOUS; SUBCUTANEOUS at 22:06

## 2022-01-25 RX ADMIN — Medication 108 GRAM(S): at 06:02

## 2022-01-26 LAB
HCT VFR BLD CALC: 26.8 % — LOW (ref 34.5–45)
HGB BLD-MCNC: 8.7 G/DL — LOW (ref 11.5–15.5)

## 2022-01-26 RX ADMIN — Medication 600 MILLIGRAM(S): at 12:52

## 2022-01-26 RX ADMIN — Medication 600 MILLIGRAM(S): at 01:00

## 2022-01-26 RX ADMIN — Medication 975 MILLIGRAM(S): at 21:00

## 2022-01-26 RX ADMIN — Medication 975 MILLIGRAM(S): at 03:35

## 2022-01-26 RX ADMIN — Medication 1 TABLET(S): at 09:09

## 2022-01-26 RX ADMIN — Medication 600 MILLIGRAM(S): at 07:00

## 2022-01-26 RX ADMIN — Medication 975 MILLIGRAM(S): at 10:00

## 2022-01-26 RX ADMIN — Medication 975 MILLIGRAM(S): at 22:00

## 2022-01-26 RX ADMIN — Medication 975 MILLIGRAM(S): at 04:36

## 2022-01-26 RX ADMIN — Medication 600 MILLIGRAM(S): at 00:05

## 2022-01-26 RX ADMIN — Medication 600 MILLIGRAM(S): at 06:00

## 2022-01-26 RX ADMIN — Medication 600 MILLIGRAM(S): at 13:00

## 2022-01-26 RX ADMIN — Medication 50 MICROGRAM(S): at 05:35

## 2022-01-26 RX ADMIN — SODIUM CHLORIDE 3 MILLILITER(S): 9 INJECTION INTRAMUSCULAR; INTRAVENOUS; SUBCUTANEOUS at 06:00

## 2022-01-26 RX ADMIN — Medication 975 MILLIGRAM(S): at 09:09

## 2022-01-26 RX ADMIN — Medication 600 MILLIGRAM(S): at 17:00

## 2022-01-26 NOTE — PROGRESS NOTE ADULT - SUBJECTIVE AND OBJECTIVE BOX
LOIDA MA is a 32yo  now PP day 1 s/p uncomplicated  at 39 weeks 6 days gestation.     S:    The patient has no complaints. Pain controlled with medication   She is ambulating without difficulty and tolerating PO.   Pt is breast feeding infant.   Pt denies SOB, dizziness, n/v/d/c, and chest pain.   + flatus/-BM/+ voiding     O:    T(C): 36.7 (22 @ 23:30), Max: 36.8 (22 @ 16:00)  HR: 76 (22 @ 23:30) (59 - 95)  BP: 113/71 (22 @ 23:30) (110/79 - 128/63)  RR: 16 (22 @ 23:30) (16 - 20)  SpO2: 99% (22 @ 23:30) (99% - 100%)    Gen: NAD, AOx3  CV: RRR  Pulm: CTAB  Abdomen:  soft, appropriately tender, non-distended, +bowel sounds.  Uterus:  Fundus firm below umbilicus  VE:  +lochia  Ext:  Non-tender, no edema                           11.6   11.08 )-----------( 147      ( 2022 12:37 )             35.1       A/P: 32yo  now PP day 1 s/p uncomplicated  at 39 weeks 6 days gestation.   -Vital Signs Stable  -Postpartum CBC /pending  -Voiding, tolerating PO, bowel function nml   -Advance care as tolerated   -Continue routine postpartum care and education.  -Ambulation encouraged.  -Healthy male infant, desires circumcision.

## 2022-01-27 ENCOUNTER — TRANSCRIPTION ENCOUNTER (OUTPATIENT)
Age: 32
End: 2022-01-27

## 2022-01-27 VITALS
HEART RATE: 54 BPM | SYSTOLIC BLOOD PRESSURE: 126 MMHG | RESPIRATION RATE: 17 BRPM | OXYGEN SATURATION: 99 % | TEMPERATURE: 98 F | DIASTOLIC BLOOD PRESSURE: 77 MMHG

## 2022-01-27 RX ORDER — LEVOTHYROXINE SODIUM 125 MCG
1 TABLET ORAL
Qty: 0 | Refills: 0 | DISCHARGE
Start: 2022-01-27

## 2022-01-27 RX ORDER — ACETAMINOPHEN 500 MG
3 TABLET ORAL
Qty: 0 | Refills: 0 | DISCHARGE
Start: 2022-01-27

## 2022-01-27 RX ORDER — IBUPROFEN 200 MG
1 TABLET ORAL
Qty: 0 | Refills: 0 | DISCHARGE
Start: 2022-01-27

## 2022-01-27 RX ADMIN — Medication 1 TABLET(S): at 09:16

## 2022-01-27 RX ADMIN — Medication 975 MILLIGRAM(S): at 03:00

## 2022-01-27 RX ADMIN — Medication 975 MILLIGRAM(S): at 03:38

## 2022-01-27 RX ADMIN — Medication 600 MILLIGRAM(S): at 00:07

## 2022-01-27 RX ADMIN — Medication 975 MILLIGRAM(S): at 09:16

## 2022-01-27 RX ADMIN — Medication 600 MILLIGRAM(S): at 07:31

## 2022-01-27 RX ADMIN — Medication 600 MILLIGRAM(S): at 06:30

## 2022-01-27 RX ADMIN — Medication 600 MILLIGRAM(S): at 01:07

## 2022-01-27 RX ADMIN — Medication 50 MICROGRAM(S): at 06:30

## 2022-01-27 NOTE — DISCHARGE NOTE OB - NS MD DC FALL RISK RISK
For information on Fall & Injury Prevention, visit: [email or web address] OR  [email or web address] OR  https://www.cdc.gov/steadi/patient.html

## 2022-01-27 NOTE — DISCHARGE NOTE OB - CARE PLAN
1 Principal Discharge DX:	 (normal spontaneous vaginal delivery)  Assessment and plan of treatment:	31 year old  postpartum day 2 from an uncomplicated  at 39 weeks 6 days gestation. Follow up with OB outpatient in 2-6 weeks. Return precautions discussed and patient understands.

## 2022-01-27 NOTE — PROGRESS NOTE ADULT - SUBJECTIVE AND OBJECTIVE BOX
LOIDA MA is a 32yo  now PP day 2 s/p uncomplicated  at 39 weeks 6 days gestation.     S:    The patient has no complaints. Pain controlled with medication   She is ambulating without difficulty and tolerating PO.   Pt denies any SOB, dizziness, headaches and chest pain.   Pt is breast feeding.   + flatus/-BM/+ voiding     O:    T(C): 36.6 (22 @ 19:30), Max: 36.6 (22 @ 19:30)  HR: 77 (22 @ 19:30) (77 - 78)  BP: 116/70 (22 @ 19:30) (108/64 - 116/70)  RR: 16 (22 @ 19:30) (16 - 17)  SpO2: 100% (22 @ 19:30) (97% - 100%)    Gen: NAD, AOx3  CV: RRR  Pulm: CTAB  Abdomen:  soft, appropriately tender, non-distended, +bowel sounds.  Uterus:  Fundus firm below umbilicus  VE:  +lochia  Ext:  Non-tender, no edema                           8.7    x     )-----------( x        ( 2022 08:35 )             26.8     A/P:  32yo  now PP day 2 s/p uncomplicated  at 39 weeks 6 days gestation.   -Vital Signs Stable  -Postpartum CBC stable- asymptomatic anemia, most likely dilutional. Iron supplements upon discharge.   -Voiding, tolerating PO, bowel function nml   -Pt is stable for discharge  -Continue routine postpartum/postoperative care and education.  -Ambulation encouraged.  -Healthy male infant, received circumcision. Follow up with pediatrician tomorrow outpatient.

## 2022-01-27 NOTE — DISCHARGE NOTE OB - MEDICATION SUMMARY - MEDICATIONS TO TAKE
I will START or STAY ON the medications listed below when I get home from the hospital:    ibuprofen 600 mg oral tablet  -- 1 tab(s) by mouth every 6 hours  -- Indication: For  (normal spontaneous vaginal delivery)    acetaminophen 325 mg oral tablet  -- 3 tab(s) by mouth every 6 hours  -- Indication: For  (normal spontaneous vaginal delivery)    metFORMIN 500 mg oral tablet  -- 1 tab(s) by mouth 2 times a day  -- Indication: For PCOS    ferrous sulfate 325 mg (65 mg elemental iron) oral tablet  -- 1 tab(s) by mouth every other day  -- Indication: For Anemia     levothyroxine 50 mcg (0.05 mg) oral tablet  -- 1 tab(s) by mouth once a day  -- Indication: For Hypothyroidism

## 2022-01-27 NOTE — DISCHARGE NOTE OB - PATIENT PORTAL LINK FT
You can access the FollowMyHealth Patient Portal offered by [facility] by registering at the following website: [email or web address]. By joining [employer]’s FollowMyHealth portal, you will also be able to view your health information using other applications (apps) compatible with our system.

## 2022-01-27 NOTE — DISCHARGE NOTE OB - PLAN OF CARE
31 year old  postpartum day 2 from an uncomplicated  at 39 weeks 6 days gestation. Follow up with OB outpatient in 2-6 weeks. Return precautions discussed and patient understands.

## 2022-01-27 NOTE — DISCHARGE NOTE OB - NS AS DC STROKE DX YN
Last 5 Patient Entered Readings                                                               Current 30 Day Average: 126/81     Recent Readings 3/23/2017 3/22/2017 3/21/2017 3/20/2017 3/19/2017    Systolic BP (mmHg) 122 132 124 130 129    Diastolic BP (mmHg) 82 87 83 84 87    Pulse 82 84 87 85 88        Follow up with Ms. Ana Aguila completed. Ms. Aguila is doing well. She is at work today and didn't have much time to follow up. Patient reports that she is maintaining her diet.  Patient did not have any further questions or concerns. I will follow up in a few weeks to see how she is doing and progressing.        
no

## 2022-01-27 NOTE — DISCHARGE NOTE OB - CARE PROVIDER_API CALL
Aleksander Sanchez)  Obstetrics and Gynecology  [address] [place] [address]  Phone: [phone]  Fax: [phone]  Follow Up Time:

## 2022-01-27 NOTE — DISCHARGE NOTE OB - CARE PROVIDERS DIRECT ADDRESSES
[email or web address]

## 2022-02-01 ENCOUNTER — INPATIENT (INPATIENT)
Facility: HOSPITAL | Age: 32
LOS: 1 days | Discharge: HOME | DRG: 776 | End: 2022-02-03
Attending: OBSTETRICS & GYNECOLOGY | Admitting: OBSTETRICS & GYNECOLOGY
Payer: COMMERCIAL

## 2022-02-01 VITALS — DIASTOLIC BLOOD PRESSURE: 71 MMHG | HEART RATE: 47 BPM | SYSTOLIC BLOOD PRESSURE: 150 MMHG

## 2022-02-01 DIAGNOSIS — Z3A.00 WEEKS OF GESTATION OF PREGNANCY NOT SPECIFIED: ICD-10-CM

## 2022-02-01 DIAGNOSIS — O26.899 OTHER SPECIFIED PREGNANCY RELATED CONDITIONS, UNSPECIFIED TRIMESTER: ICD-10-CM

## 2022-02-01 LAB
ALBUMIN SERPL ELPH-MCNC: 2.5 G/DL — LOW (ref 3.3–5)
ALP SERPL-CCNC: 99 U/L — SIGNIFICANT CHANGE UP (ref 40–120)
ALT FLD-CCNC: 72 U/L — SIGNIFICANT CHANGE UP (ref 12–78)
ANION GAP SERPL CALC-SCNC: 4 MMOL/L — LOW (ref 5–17)
APTT BLD: 31.6 SEC — SIGNIFICANT CHANGE UP (ref 27.5–35.5)
AST SERPL-CCNC: 27 U/L — SIGNIFICANT CHANGE UP (ref 15–37)
BASOPHILS # BLD AUTO: 0.05 K/UL — SIGNIFICANT CHANGE UP (ref 0–0.2)
BASOPHILS NFR BLD AUTO: 0.5 % — SIGNIFICANT CHANGE UP (ref 0–2)
BILIRUB SERPL-MCNC: 0.2 MG/DL — SIGNIFICANT CHANGE UP (ref 0.2–1.2)
BUN SERPL-MCNC: 20 MG/DL — SIGNIFICANT CHANGE UP (ref 7–23)
CALCIUM SERPL-MCNC: 9.1 MG/DL — SIGNIFICANT CHANGE UP (ref 8.5–10.1)
CHLORIDE SERPL-SCNC: 109 MMOL/L — HIGH (ref 96–108)
CO2 SERPL-SCNC: 28 MMOL/L — SIGNIFICANT CHANGE UP (ref 22–31)
CREAT SERPL-MCNC: 1 MG/DL — SIGNIFICANT CHANGE UP (ref 0.5–1.3)
EOSINOPHIL # BLD AUTO: 0.22 K/UL — SIGNIFICANT CHANGE UP (ref 0–0.5)
EOSINOPHIL NFR BLD AUTO: 2.2 % — SIGNIFICANT CHANGE UP (ref 0–6)
FIBRINOGEN PPP-MCNC: 586 MG/DL — HIGH (ref 290–520)
GLUCOSE SERPL-MCNC: 99 MG/DL — SIGNIFICANT CHANGE UP (ref 70–99)
HCT VFR BLD CALC: 29.1 % — LOW (ref 34.5–45)
HGB BLD-MCNC: 9.6 G/DL — LOW (ref 11.5–15.5)
IMM GRANULOCYTES NFR BLD AUTO: 1.7 % — HIGH (ref 0–1.5)
INR BLD: 0.97 RATIO — SIGNIFICANT CHANGE UP (ref 0.88–1.16)
LDH SERPL L TO P-CCNC: 229 U/L — SIGNIFICANT CHANGE UP (ref 84–241)
LYMPHOCYTES # BLD AUTO: 2.46 K/UL — SIGNIFICANT CHANGE UP (ref 1–3.3)
LYMPHOCYTES # BLD AUTO: 24.6 % — SIGNIFICANT CHANGE UP (ref 13–44)
MCHC RBC-ENTMCNC: 30.2 PG — SIGNIFICANT CHANGE UP (ref 27–34)
MCHC RBC-ENTMCNC: 33 GM/DL — SIGNIFICANT CHANGE UP (ref 32–36)
MCV RBC AUTO: 91.5 FL — SIGNIFICANT CHANGE UP (ref 80–100)
MONOCYTES # BLD AUTO: 0.6 K/UL — SIGNIFICANT CHANGE UP (ref 0–0.9)
MONOCYTES NFR BLD AUTO: 6 % — SIGNIFICANT CHANGE UP (ref 2–14)
NEUTROPHILS # BLD AUTO: 6.5 K/UL — SIGNIFICANT CHANGE UP (ref 1.8–7.4)
NEUTROPHILS NFR BLD AUTO: 65 % — SIGNIFICANT CHANGE UP (ref 43–77)
PLATELET # BLD AUTO: 222 K/UL — SIGNIFICANT CHANGE UP (ref 150–400)
POTASSIUM SERPL-MCNC: 3.7 MMOL/L — SIGNIFICANT CHANGE UP (ref 3.5–5.3)
POTASSIUM SERPL-SCNC: 3.7 MMOL/L — SIGNIFICANT CHANGE UP (ref 3.5–5.3)
PROT SERPL-MCNC: 6.2 GM/DL — SIGNIFICANT CHANGE UP (ref 6–8.3)
PROTHROM AB SERPL-ACNC: 11.3 SEC — SIGNIFICANT CHANGE UP (ref 10.6–13.6)
RBC # BLD: 3.18 M/UL — LOW (ref 3.8–5.2)
RBC # FLD: 13.3 % — SIGNIFICANT CHANGE UP (ref 10.3–14.5)
SODIUM SERPL-SCNC: 141 MMOL/L — SIGNIFICANT CHANGE UP (ref 135–145)
URATE SERPL-MCNC: 5.8 MG/DL — SIGNIFICANT CHANGE UP (ref 2.5–7)
WBC # BLD: 10 K/UL — SIGNIFICANT CHANGE UP (ref 3.8–10.5)
WBC # FLD AUTO: 10 K/UL — SIGNIFICANT CHANGE UP (ref 3.8–10.5)

## 2022-02-01 PROCEDURE — 84550 ASSAY OF BLOOD/URIC ACID: CPT

## 2022-02-01 PROCEDURE — 86850 RBC ANTIBODY SCREEN: CPT

## 2022-02-01 PROCEDURE — 93010 ELECTROCARDIOGRAM REPORT: CPT

## 2022-02-01 PROCEDURE — G0463: CPT

## 2022-02-01 PROCEDURE — U0003: CPT

## 2022-02-01 PROCEDURE — 83615 LACTATE (LD) (LDH) ENZYME: CPT

## 2022-02-01 PROCEDURE — 85384 FIBRINOGEN ACTIVITY: CPT

## 2022-02-01 PROCEDURE — 86900 BLOOD TYPING SEROLOGIC ABO: CPT

## 2022-02-01 PROCEDURE — 93005 ELECTROCARDIOGRAM TRACING: CPT

## 2022-02-01 PROCEDURE — 36415 COLL VENOUS BLD VENIPUNCTURE: CPT

## 2022-02-01 PROCEDURE — 85025 COMPLETE CBC W/AUTO DIFF WBC: CPT

## 2022-02-01 PROCEDURE — 85610 PROTHROMBIN TIME: CPT

## 2022-02-01 PROCEDURE — U0005: CPT

## 2022-02-01 PROCEDURE — 86901 BLOOD TYPING SEROLOGIC RH(D): CPT

## 2022-02-01 PROCEDURE — 80053 COMPREHEN METABOLIC PANEL: CPT

## 2022-02-01 PROCEDURE — 83735 ASSAY OF MAGNESIUM: CPT

## 2022-02-01 PROCEDURE — 85730 THROMBOPLASTIN TIME PARTIAL: CPT

## 2022-02-01 RX ORDER — LEVOTHYROXINE SODIUM 125 MCG
50 TABLET ORAL DAILY
Refills: 0 | Status: DISCONTINUED | OUTPATIENT
Start: 2022-02-01 | End: 2022-02-03

## 2022-02-01 RX ORDER — HYDRALAZINE HCL 50 MG
5 TABLET ORAL ONCE
Refills: 0 | Status: COMPLETED | OUTPATIENT
Start: 2022-02-01 | End: 2022-02-01

## 2022-02-01 RX ORDER — LANOLIN ALCOHOL/MO/W.PET/CERES
5 CREAM (GRAM) TOPICAL AT BEDTIME
Refills: 0 | Status: DISCONTINUED | OUTPATIENT
Start: 2022-02-01 | End: 2022-02-03

## 2022-02-01 RX ORDER — SODIUM CHLORIDE 9 MG/ML
1000 INJECTION, SOLUTION INTRAVENOUS
Refills: 0 | Status: DISCONTINUED | OUTPATIENT
Start: 2022-02-01 | End: 2022-02-03

## 2022-02-01 RX ORDER — MAGNESIUM SULFATE 500 MG/ML
2 VIAL (ML) INJECTION
Qty: 40 | Refills: 0 | Status: DISCONTINUED | OUTPATIENT
Start: 2022-02-01 | End: 2022-02-02

## 2022-02-01 RX ORDER — ACETAMINOPHEN 500 MG
650 TABLET ORAL EVERY 6 HOURS
Refills: 0 | Status: DISCONTINUED | OUTPATIENT
Start: 2022-02-01 | End: 2022-02-03

## 2022-02-01 RX ORDER — METFORMIN HYDROCHLORIDE 850 MG/1
500 TABLET ORAL EVERY 12 HOURS
Refills: 0 | Status: DISCONTINUED | OUTPATIENT
Start: 2022-02-01 | End: 2022-02-03

## 2022-02-01 RX ORDER — NIFEDIPINE 30 MG
30 TABLET, EXTENDED RELEASE 24 HR ORAL DAILY
Refills: 0 | Status: DISCONTINUED | OUTPATIENT
Start: 2022-02-01 | End: 2022-02-02

## 2022-02-01 RX ORDER — SODIUM CHLORIDE 9 MG/ML
1000 INJECTION INTRAMUSCULAR; INTRAVENOUS; SUBCUTANEOUS
Refills: 0 | Status: DISCONTINUED | OUTPATIENT
Start: 2022-02-01 | End: 2022-02-01

## 2022-02-01 RX ORDER — MAGNESIUM SULFATE 500 MG/ML
4 VIAL (ML) INJECTION ONCE
Refills: 0 | Status: COMPLETED | OUTPATIENT
Start: 2022-02-01 | End: 2022-02-01

## 2022-02-01 RX ADMIN — Medication 5 MILLIGRAM(S): at 23:16

## 2022-02-01 RX ADMIN — Medication 300 GRAM(S): at 19:45

## 2022-02-01 RX ADMIN — Medication 50 GM/HR: at 22:14

## 2022-02-01 RX ADMIN — Medication 5 MILLIGRAM(S): at 19:52

## 2022-02-01 RX ADMIN — METFORMIN HYDROCHLORIDE 500 MILLIGRAM(S): 850 TABLET ORAL at 22:55

## 2022-02-01 RX ADMIN — Medication 650 MILLIGRAM(S): at 20:33

## 2022-02-01 NOTE — H&P ADULT - ATTENDING COMMENTS
Pt seen and evaluated. R/B of Magnesium Sulfate IV therapy informed. Informed of need for antihypertensives.   Responded well to 1 dose of 5mg IV Hydralazine.  Pt expresses understanding of need for intervention and treatment.  Will follow closely on L&D and treat BPs in severe range.   PIH labs reviewed - WNR  Repeat labs in am.  Will get cardiology consult in am for sinus bradycardia and to determine which antihypertensive most appropriate for patient if she does not respond to IV hydralazine.

## 2022-02-01 NOTE — OB POSTPARTUM TRIAGE NOTE - FALL HARM RISK - UNIVERSAL INTERVENTIONS
Bed in lowest position, wheels locked, appropriate side rails in place/Call bell, personal items and telephone in reach/Instruct patient to call for assistance before getting out of bed or chair/Non-slip footwear when patient is out of bed/[place] to call system/Physically safe environment - no spills, clutter or unnecessary equipment/Purposeful Proactive Rounding/Room/bathroom lighting operational, light cord in reach

## 2022-02-01 NOTE — OB POSTPARTUM TRIAGE NOTE - BSA (M2)
Called patient to notify of need for asthma visit. Provided patient with office phone number for scheduling. 2.11

## 2022-02-01 NOTE — H&P ADULT - HISTORY OF PRESENT ILLNESS
31y  PPD#7 s/p uncomplicated  who presents with elevated BPs. Patient endorses intermittent headaches responsive to OTC pain medications. Denies headaches, visual disturbances, RUQ pain, epigastric pain and new-onset edema. Denies fevers, chills, nausea and vomiting. No other complaints at this time. She is tolerating PO, ambulating and voiding without difficulty, +flatus/+BM, formula feeding, and minimal lochia. She endorses a good mood.     POB: full term uncomplicated  x1  PGYN: PCOS, -fibroids, denies STD hx, denies abnormal PAPs   PMH: Hypothyroidism   PSH: Hernia repair   SH: Denies EtOH, tobacco and illicit drug use; feels safe at home   Meds: PNVs, levothyroxine, metformin   Allergies: NKDA     31y  PPD#7 s/p uncomplicated  who presents with elevated BPs. Patient endorses intermittent headaches responsive to OTC pain medications. Denies headaches, visual disturbances, RUQ pain, epigastric pain and new-onset edema. Denies fevers, chills, nausea and vomiting. No other complaints at this time. She is tolerating PO, ambulating and voiding without difficulty, +flatus/+BM, formula feeding, and minimal lochia. She endorses a good mood.   While patient in triage, she was noted to have severe range BPs.   161/74 @1933  161/70 @  Hydralazine 5mg IV given at ; MgSO4 started at   137/70 @     POB: full term uncomplicated  x1  PGYN: PCOS, -fibroids, denies STD hx, denies abnormal PAPs   PMH: Hypothyroidism   PSH: Hernia repair   SH: Denies EtOH, tobacco and illicit drug use; feels safe at home   Meds: PNVs, levothyroxine, metformin   Allergies: NKDA

## 2022-02-01 NOTE — H&P ADULT - NSHPPHYSICALEXAM_GEN_ALL_CORE
See CPN for Vital Signs     Gen: NAD, well-appearing   Heart: Regular rhythm, bradycardic   Lungs: CTAB   Abd: Soft, gravid  Ext: non-tender, non-edematous  Pelvic: minimal lochia

## 2022-02-01 NOTE — H&P ADULT - NSHPLABSRESULTS_GEN_ALL_CORE
9.6    10.00 )-----------( 222      ( 01 Feb 2022 18:56 )             29.1    02-01    141  |  109<H>  |  20  ----------------------------<  99  3.7   |  28  |  1.00    Ca    9.1      01 Feb 2022 18:56    TPro  6.2  /  Alb  2.5<L>  /  TBili  0.2  /  DBili  x   /  AST  27  /  ALT  72  /  AlkPhos  99  02-01      Prothrombin Time and INR, Plasma (02.01.22 @ 18:56)    Prothrombin Time, Plasma: 11.3 sec    INR: 0.97: Recommended targets/ranges for therapeutic INR:  2.0-3.0 Deep vein thrombosis, pulmonary embolism, atrial fibrillation  2.0-3.0 Mechanical aortic valve, antiphospholipid syndrome with previous  arterial or venous thromboembolism  2.5-3.5 Mechanical mitral valve, double mechanical valve (aortic and  mitral positions, high risk valves)  Note: Chest 2012 Feb;141(2 Suppl):7S-47S  Routine coagulation results should be interpreted with caution when  taking Factor Xa inhibitors or direct thrombin inhibitors; blood sampling  prior to drug intake is recommended. ratio    Activated Partial Thromboplastin Time (02.01.22 @ 18:56)    Activated Partial Thromboplastin Time: 31.6: The recommended therapeutic heparin range (full dose) is 58-99 seconds.  Argatroban range is 1.5 to 3.0 times of the baseline APTT value, not to  exceed 100 seconds.  Routine coagulation results should be interpreted with caution when  taking Factor Xa inhibitors or direct thrombin inhibitors; blood sampling  prior to drug intake is recommended. sec    Fibrinogen Assay: 586: Fibrinogen is a PT-based methodology, determined from the entire clotting  end-point. The method is not prolonged by heparin, FDP or thrombolytic  agents, unless the PT is excessively prolonged. When prolonged,  fibrinogen will be determined by another method.  There is a potential underestimation by the assay in patients treated  with factor Xa inhibitors or direct thrombin inhibitors; blood sampling  prior to drug intake is recommended. mg/dL (02.01.22 @ 18:56)
Alert and oriented to person, place and time

## 2022-02-01 NOTE — H&P ADULT - ASSESSMENT
31y  PPD#7 s/p uncomplicated  who is being admitted to L&D for management of postpartum preeclampsia by severe range BPs requiring IV 5mg hydralazine x1. Currently on MgSO4.     1. Postpartum Preeclampsia  -Asymptomatic   -BP now stabilized  -Bradycardia on admission, EKG ordered   -Continuous Blood Pressure monitoring   -Strict I's and O's   -MgSO4 bolus started at 1945, continue for 24 hours, Mg checks every 6 hours   -PIH labs WNL as above     -Presented with severe range BPs  2. Postpartum   -Continue PNVs  -Not breastfeeding     3. Hypothyroidism   -Asymptomatic, no concerns at this time   -Continue levothyroxine 50 mcg     4. PCOS  -Asymptomatic, no concerns at this time   -Continue metformin 500mg BID     5. DVT ppx  -Ambulate and SCDs while in bed      Discussed with Dr. Noonan.

## 2022-02-02 LAB
MAGNESIUM SERPL-MCNC: 4.9 MG/DL — HIGH (ref 1.6–2.6)
MAGNESIUM SERPL-MCNC: 6 MG/DL — HIGH (ref 1.6–2.6)
MAGNESIUM SERPL-MCNC: 6.2 MG/DL — HIGH (ref 1.6–2.6)
SARS-COV-2 RNA SPEC QL NAA+PROBE: SIGNIFICANT CHANGE UP

## 2022-02-02 PROCEDURE — 99254 IP/OBS CNSLTJ NEW/EST MOD 60: CPT

## 2022-02-02 PROCEDURE — 93010 ELECTROCARDIOGRAM REPORT: CPT

## 2022-02-02 RX ORDER — MAGNESIUM SULFATE 500 MG/ML
1.5 VIAL (ML) INJECTION
Qty: 40 | Refills: 0 | Status: DISCONTINUED | OUTPATIENT
Start: 2022-02-02 | End: 2022-02-03

## 2022-02-02 RX ORDER — IBUPROFEN 200 MG
600 TABLET ORAL EVERY 6 HOURS
Refills: 0 | Status: DISCONTINUED | OUTPATIENT
Start: 2022-02-02 | End: 2022-02-03

## 2022-02-02 RX ADMIN — Medication 650 MILLIGRAM(S): at 03:08

## 2022-02-02 RX ADMIN — METFORMIN HYDROCHLORIDE 500 MILLIGRAM(S): 850 TABLET ORAL at 12:23

## 2022-02-02 RX ADMIN — Medication 37.5 GM/HR: at 17:11

## 2022-02-02 RX ADMIN — Medication 650 MILLIGRAM(S): at 22:10

## 2022-02-02 RX ADMIN — Medication 5 MILLIGRAM(S): at 21:15

## 2022-02-02 RX ADMIN — Medication 600 MILLIGRAM(S): at 12:22

## 2022-02-02 RX ADMIN — Medication 650 MILLIGRAM(S): at 09:19

## 2022-02-02 RX ADMIN — Medication 50 MICROGRAM(S): at 06:41

## 2022-02-02 RX ADMIN — Medication 600 MILLIGRAM(S): at 18:04

## 2022-02-02 RX ADMIN — Medication 650 MILLIGRAM(S): at 21:15

## 2022-02-02 RX ADMIN — METFORMIN HYDROCHLORIDE 500 MILLIGRAM(S): 850 TABLET ORAL at 21:15

## 2022-02-02 RX ADMIN — Medication 600 MILLIGRAM(S): at 05:22

## 2022-02-02 NOTE — CHART NOTE - NSCHARTNOTEFT_GEN_A_CORE
Patient seen and evaluated at bedside for chest pressure. Chest pressure is still present, no worsening, non-radiating, no alleviating or exacerbating factors. Endorses headaches, but denies visual disturbances, RUQ pain, epigastric pain and new-onset edema. Denies drowsiness, somnolence and SOB.     Vital Signs Last 24 Hrs  T(C): 36.2 (02 Feb 2022 02:50), Max: 36.2 (02 Feb 2022 02:50)  T(F): 97.2 (02 Feb 2022 02:50), Max: 97.2 (02 Feb 2022 02:50)  HR: 72 (02 Feb 2022 04:45) (46 - 81)  BP: 125/81 (02 Feb 2022 04:45) (101/60 - 168/79)  RR: 18 (02 Feb 2022 04:45) (13 - 18)  SpO2: 100% (02 Feb 2022 04:45) (98% - 100%)    General: NAD, well-appearing  Heart: RRR  Lungs: CTAB  Abdominal: soft, non-tender, non-distended, no rebound or guarding, +BS  Ext: non-tender, non-edematous, 2+ DTRs in UE bilaterally     Magnesium, Serum (02.02.22 @ 00:41)    Magnesium, Serum: 4.9 mg/dL    Patient with continued chest pressure, but otherwise asymptomatic. PE benign. EKG ordered. Motrin 600mg ordered for headache.

## 2022-02-02 NOTE — PROGRESS NOTE ADULT - SUBJECTIVE AND OBJECTIVE BOX
S: PPD# 8 --PP PET    No complaints.     O: Vital Signs Last 24 Hrs  T(C): 36.2 (2022 02:50), Max: 36.2 (2022 02:50)  T(F): 97.2 (2022 02:50), Max: 97.2 (2022 02:50)  HR: 72 (2022 04:45) (46 - 81)  BP: 125/81 (2022 04:45) (101/60 - 168/79)  BP(mean): --  RR: 18 (2022 04:45) (13 - 18)  SpO2: 100% (2022 04:45) (98% - 100%)    Gen: NAD  Abd: soft, NT, ND, fundus firm below umbilicus  Perineum: intact  Labs:                        9.6    10.00 )-----------( 222      ( 2022 18:56 )             29.1     Antibody Screen: NEG ( @ 00:41)      A: 31y PPD# 7 s/p  --- now with PP PET on Mg++ -- BPs stabilized    Doing well    Plan: continue MG++ for 24 hours -- Rx BPs as needed

## 2022-02-02 NOTE — CHART NOTE - NSCHARTNOTEFT_GEN_A_CORE
Patient pp readmit for preeclampsia ppd #7  now on magnesium to stop 2200  h/o complicated hypothyroid  rbb block/ prolonged qt    pt now no complaints  denies chestpain, sob    exam benign  mag level now 6    pt seen by hospitalist note appreciated  wants to leave after mag turned off  bp's stable  aware both I and dr Sanchez agree that should continue 12 more hours observation post magnesium  plan for am discharge

## 2022-02-03 ENCOUNTER — TRANSCRIPTION ENCOUNTER (OUTPATIENT)
Age: 32
End: 2022-02-03

## 2022-02-03 VITALS
RESPIRATION RATE: 16 BRPM | HEART RATE: 66 BPM | TEMPERATURE: 98 F | OXYGEN SATURATION: 98 % | SYSTOLIC BLOOD PRESSURE: 131 MMHG | DIASTOLIC BLOOD PRESSURE: 79 MMHG

## 2022-02-03 DIAGNOSIS — Z87.19 PERSONAL HISTORY OF OTHER DISEASES OF THE DIGESTIVE SYSTEM: ICD-10-CM

## 2022-02-03 DIAGNOSIS — E03.9 HYPOTHYROIDISM, UNSPECIFIED: ICD-10-CM

## 2022-02-03 DIAGNOSIS — Z87.42 PERSONAL HISTORY OF OTHER DISEASES OF THE FEMALE GENITAL TRACT: ICD-10-CM

## 2022-02-03 PROCEDURE — 99232 SBSQ HOSP IP/OBS MODERATE 35: CPT

## 2022-02-03 RX ORDER — FERROUS SULFATE 325(65) MG
1 TABLET ORAL
Qty: 0 | Refills: 0 | DISCHARGE

## 2022-02-03 RX ORDER — METFORMIN HYDROCHLORIDE 850 MG/1
1 TABLET ORAL
Qty: 0 | Refills: 0 | DISCHARGE

## 2022-02-03 RX ORDER — PROCHLORPERAZINE MALEATE 5 MG
5 TABLET ORAL ONCE
Refills: 0 | Status: DISCONTINUED | OUTPATIENT
Start: 2022-02-03 | End: 2022-02-03

## 2022-02-03 RX ADMIN — Medication 50 MICROGRAM(S): at 06:44

## 2022-02-03 RX ADMIN — Medication 600 MILLIGRAM(S): at 00:11

## 2022-02-03 RX ADMIN — Medication 600 MILLIGRAM(S): at 01:05

## 2022-02-03 RX ADMIN — METFORMIN HYDROCHLORIDE 500 MILLIGRAM(S): 850 TABLET ORAL at 09:27

## 2022-02-03 RX ADMIN — Medication 1 TABLET(S): at 09:27

## 2022-02-03 NOTE — PROGRESS NOTE ADULT - REASON FOR ADMISSION
Postpartum preeclampsia with severe features

## 2022-02-03 NOTE — DISCHARGE NOTE NURSING/CASE MANAGEMENT/SOCIAL WORK - PATIENT PORTAL LINK FT
You can access the FollowMyHealth Patient Portal offered by [facility] by registering at the following website: [email or web address]. By joining [employer]’s FollowMyHealth portal, you will also be able to view your health information using other applications (apps) compatible with our system.

## 2022-02-03 NOTE — CONSULT NOTE ADULT - SUBJECTIVE AND OBJECTIVE BOX
CHIEF COMPLAINT:  Patient is a 31y old  Female who presents with a chief complaint of Postpartum elevated blood pressure (2022 16:59)      HPI: 2/3/22:  31y with h/o PCOS and IBS and is  PPD#7 s/p uncomplicated  who presents with elevated BPs. Patient endorses intermittent headaches responsive to OTC pain medications. Denies headaches, visual disturbances, RUQ pain, epigastric pain and new-onset edema. Denies fevers, chills, nausea and vomiting. No other complaints at this time. She is tolerating PO, ambulating and voiding without difficulty, +flatus/+BM, formula feeding, and minimal lochia. She endorses a good mood.   While patient in triage, she was noted to have severe range BPs.   161/74 @  161/70 @  Hydralazine 5mg IV given at ; MgSO4 started at   137/70 @     She was initially having a vague chest discomfort when her BP was high and her pulse rate was low, in response to her HTN.  She was admitted and given 1 dose of Hydralazine and treated with Magnesium as per pre-eclampsia protocol finishing last night  BP has been normal since and she feels back to her normal state, denying HA's or recurrent chest discomfort.  Her EKG did not show acute changes and is eager go home.        PMHx:  PAST MEDICAL & SURGICAL HISTORY:  History of PCOS  History of IBS  Hypothyroidism  No significant past surgical history      FAMILY HISTORY:   FAMILY HISTORY:  Father had non-Hodgkin's lymphoma and stent PCI's at ag e62      ALLERGIES:  Allergies  penicillin (Rash)        REVIEW OF SYSTEMS:  10 point ROS was obtained  Pertinent positives and negatives are as above  All other review of systems is negative unless indicated above      Vital Signs Last 24 Hrs  T(C): 36.6 (2022 05:30), Max: 36.7 (2022 23:43)  T(F): 97.8 (2022 05:30), Max: 98 (2022 23:43)  HR: 60 (2022 05:30) (58 - 82)  BP: 108/71 (2022 05:30) (106/64 - 120/81)  BP(mean): --  RR: 16 (2022 05:30) (16 - 16)  SpO2: 98% (2022 05:30) (97% - 98%)        PHYSICAL EXAM:   Constitutional: NAD, awake and alert, well-developed  HEENT: PERR, EOMI, Normal Hearing, MMM  Neck: Soft and supple, No LAD, No JVD  Respiratory: Breath sounds are clear bilaterally, No wheezing, rales or rhonchi  Cardiovascular: S1 and S2, regular rate and rhythm, soft AGGIE at LLSB and base as before, no gallops or rubs  Gastrointestinal: Bowel Sounds present, soft, nontender, nondistended, no guarding, no rebound  Extremities: No peripheral edema  Vascular: 2+ peripheral pulses  Neurological: A/O x 3, no focal deficits  Musculoskeletal: 5/5 strength b/l upper and lower extremities  Skin: No rashes      MEDICATIONS  (STANDING):  lactated ringers. 1000 milliLiter(s) (80 mL/Hr) IV Continuous <Continuous>  lactated ringers. 1000 milliLiter(s) (75 mL/Hr) IV Continuous <Continuous>  levothyroxine 50 MICROGram(s) Oral daily  magnesium sulfate Infusion 1.5 Gm/Hr (37.5 mL/Hr) IV Continuous <Continuous>  melatonin 5 milliGRAM(s) Oral at bedtime  metFORMIN 500 milliGRAM(s) Oral every 12 hours  prenatal multivitamin 1 Tablet(s) Oral daily    MEDICATIONS  (PRN):  acetaminophen     Tablet .. 650 milliGRAM(s) Oral every 6 hours PRN Moderate Pain (4 - 6)  ibuprofen  Tablet. 600 milliGRAM(s) Oral every 6 hours PRN Mild Pain (1 - 3), Moderate Pain (4 - 6)      LABS: All Labs Reviewed:                        9.6    10.00 )-----------( 222      ( 2022 18:56 )             29.1     02-01    141  |  109<H>  |  20  ----------------------------<  99  3.7   |  28  |  1.00    Ca    9.1      2022 18:56  Mg     6.0     02-02    TPro  6.2  /  Alb  2.5<L>  /  TBili  0.2  /  DBili  x   /  AST  27  /  ALT  72  /  AlkPhos  99  02-    PT/INR - ( 2022 18:56 )   PT: 11.3 sec;   INR: 0.97 ratio       PTT - ( 2022 18:56 )  PTT:31.6 sec    BLOOD CULTURES:   LIPID PROFILE     RADIOLOGY:     EK22:  Normal sinus rhythm  Incomplete right bundle branch block  Nonspecific ST abnormality  Prolonged QT  When compared with ECG of 2022 22:22, No significant change was found      TELEMETRY:      ECHO:      
HPI: 31F with PMH of PCOS, Gestational DM and Hypothyroidism presents with elevated Blood pressure and HA and non-specific chest pain. Patient had uncomplicated delivery of baby 8 days prior to admission. Medicine consulted to evalute EKG changes + elevated Blood pressure. On presentation BP now back to normal not requiring further Rx. Denies fever, chills, chest pain, HA, nausea, vomiting.     Review of Systems: 14 Point review of systems reviewed and reported as negative unless otherwise stated in HPI    Surgical History: Hernia repair 2020      Family History:  Father age 63 diagnosed with NHL age 55 s/p chemotherapy and s/p PCI with stent  Mother age 56 with no reported medical issues.     Social History: Denies tobacco, etoh, illicit drug use.     PHYSICAL EXAM:    T(C): 36.4 (02-02-22 @ 15:00), Max: 36.6 (02-02-22 @ 09:45)  HR: 75 (02-02-22 @ 15:00) (46 - 81)  BP: 115/75 (02-02-22 @ 15:00) (101/60 - 168/79)  RR: 16 (02-02-22 @ 15:00) (13 - 18)  SpO2: 98% (02-02-22 @ 15:00) (97% - 100%)    General: AAOx3; NAD  Head: AT/NC  ENT: Moist Mucous Membranes; No Injury  Eyes: EOMI; PERRL  Neck: Non-tender; No JVD  CVS: RRR, S1&S2, No murmur, Trace LE edema  Respiratory: Lungs CTA B/L; Normal Respiratory Effort  Abdomen/GI: Soft, non-tender, non-distended, no guarding, no rebound, normal bowel sounds  : No bladder distention, No Zhao  Extremities: No cyanosis, No clubbing, Trace LE edema  MSK: No CVA tenderness, Normal ROM, No injury  Neuro: AAOx3, CNII-XII grossly intact, non-focal  Psych: Appropriate, Cooperative,   Skin: Clean, Dry and Intact                          9.6    10.00 )-----------( 222      ( 01 Feb 2022 18:56 )             29.1     02-01    141  |  109<H>  |  20  ----------------------------<  99  3.7   |  28  |  1.00    Ca    9.1      01 Feb 2022 18:56  Mg     6.0     02-02    TPro  6.2  /  Alb  2.5<L>  /  TBili  0.2  /  DBili  x   /  AST  27  /  ALT  72  /  AlkPhos  99  02-01    COVID-19 PCR: [facility] (01 Feb 2022 22:28)  COVID-19 PCR: [facility] (24 Jan 2022 12:37)    CAPILLARY BLOOD GLUCOSE        Magnesium, Serum: 6.0 mg/dL (02.02.22 @ 13:23)   Magnesium, Serum: 6.2 mg/dL (02.02.22 @ 07:33)   Magnesium, Serum: 4.9 mg/dL (02.02.22 @ 00:41)Fibrinogen Assay: 586: FUric Acid, Serum: 5.8 mg/dL (02.01.22 @ 18:56) Lactate Dehydrogenase, Serum: 229 U/L (02.01.22 @ 18:56) < from: CT Abdomen and Pelvis w/ IV Cont (01.21.20 @ 21:31) >  IMPRESSION: Normal-appearing appendix tracking into a rightinguinal hernia.    < end of copied text >  < from: 12 Lead ECG (01.21.20 @ 19:22) >  Diagnosis Line Sinus tachycardia  Left axis deviation  Pulmonary disease pattern  Incomplete right bundle branch block  Abnormal ECG    < end of copied text >  < from: 12 Lead ECG (02.02.22 @ 07:54) >  Diagnosis Line Normal sinus rhythm  Incomplete right bundle branch block  Nonspecific ST abnormality  Prolonged QT  Abnormal ECG    < end of copied text >    I personally reviewed labs, ekg, orders and vitals.

## 2022-02-03 NOTE — DISCHARGE NOTE PROVIDER - CARE PROVIDER_API CALL
Aleksander Sanchez)  Obstetrics and Gynecology  [address] [place] [address]  Phone: [phone]  Fax: [phone]  Follow Up Time: 1-3 days

## 2022-02-03 NOTE — CONSULT NOTE ADULT - ASSESSMENT
MEDICATIONS  (STANDING):  lactated ringers. 1000 milliLiter(s) (80 mL/Hr) IV Continuous <Continuous>  lactated ringers. 1000 milliLiter(s) (75 mL/Hr) IV Continuous <Continuous>  levothyroxine 50 MICROGram(s) Oral daily  magnesium sulfate Infusion 1.5 Gm/Hr (37.5 mL/Hr) IV Continuous <Continuous>  melatonin 5 milliGRAM(s) Oral at bedtime  metFORMIN 500 milliGRAM(s) Oral every 12 hours  prenatal multivitamin 1 Tablet(s) Oral daily    MEDICATIONS  (PRN):  acetaminophen     Tablet .. 650 milliGRAM(s) Oral every 6 hours PRN Moderate Pain (4 - 6)  ibuprofen  Tablet. 600 milliGRAM(s) Oral every 6 hours PRN Mild Pain (1 - 3), Moderate Pain (4 - 6)      ASSESSMENT:    Elevated Blood Pressure + HA post partum  Sinus bradycardia  Hypothyroidism  Gestational DM/PCOS    PLAN    Patient with prior hx of Incomplete RBBB in 2020. when she had hernia surgery.    EKG on this admission with corby loredo. Mildly prolonged QTc. Avoid QTc prolonging agents (example zofran)  Patient's BP elevated on admission x 1. s/p hydralazine and normal BP for >6 hours after administration.   Patient now asymptomatic and blood pressures controlled without intervention. No contraindication for discharge and close outpatient follow up. Continue pre-eclampsia management as per Ob./Gyn   Other home medication  Patient should get blood pressure cuff at home and check BP twice daily and maintain a log to be reviewed by own PMD/   Low salt diet. Outpatient follow up with PMD
2/3/22:  Pt with above history having had COVID-19 in October and unremarkable vaginal delivery 8-9 days ago with postpartum pre-eclampsia which seems to be more prevalent in this COVID pandemic.  She is clinically better after 1 dose of Hydralazine and Magnesium protocol and her BP and HR are back to her baseline.  She is stable from a cardiac standpoint for discharge if ok with OB and she will follow her BP's at home with a home BP monitor and follow up with me in 1-2 weeks.  No change in meds needed at this time.

## 2022-02-03 NOTE — DISCHARGE NOTE PROVIDER - NSDCCPCAREPLAN_GEN_ALL_CORE_FT
PRINCIPAL DISCHARGE DIAGNOSIS  Diagnosis: Pre-eclampsia, postpartum  Assessment and Plan of Treatment:

## 2022-02-03 NOTE — DISCHARGE NOTE PROVIDER - HOSPITAL COURSE
Patient admitted 1 week postpartum for PECwSF. She is s/p magnesium. After 24h magnesium here BPs remained stable and low and she did not require antihypertensives. She was discharged home on HD#3 asymptomatic with stable VS

## 2022-02-03 NOTE — DISCHARGE NOTE PROVIDER - NSDCFUADDINST_GEN_ALL_CORE_FT
1) Take your blood pressure at home 3 times a day seated after resting for at least 15 minutes. If your BP is higher than 160/110 then call your doctor  2) See Dr. Sanchez in the office on Monday or Tuesday next week  3) Come back to labor and delivery if you experience a headache that is not relieved with pain medication, changes in your vision, severe abdominal pain in the upper right part of the belly

## 2022-02-03 NOTE — PROGRESS NOTE ADULT - ATTENDING COMMENTS
Appreciate ACP help.  Pt seen and examined with NP Patria Ramesh in the morning; all labs and imaging reviewed together. POC discussed with patient and  at bedside.  The above documentation reviewed by me and contains my recommendations.

## 2022-02-03 NOTE — PROGRESS NOTE ADULT - ASSESSMENT
DVT PPX:    ADVANCED DIRECTIVE:    DISPOSITION: 31F PMH of PCOS, Gestational DM, Hypothyroidism presents with Elevated Blood Pressure + HA post partum      Postpartum preeclampsia  - Patient's BP elevated on admission x 1, s/p hydralazine and normal BP since  - s/p Mag 2++ gtt  - Patient now asymptomatic and blood pressures controlled without intervention  - Patient with prior hx of Incomplete RBBB in 2020, when she had hernia surgery. EKG on this admission with similar findings: Mildly prolonged QTc. Avoid QTc prolonging agents   - No contraindication for discharge with close outpatient follow up.   - Continue pre-eclampsia management as per OB/GYN  - Cardiology input appreciated    - Recommend Patient to check BP at home 2x day and to keep a journal should to be reviewed by PCP/Cards   - Low salt diet   - Bowel regimen  - Outpatient follow up with PCP    Hypothyroidism  - Continue Synthroid, recommend f/u with PCP/endo after dc    PCOS/Gestational DM  - continue PTA meds    VTE PPx: SCDs    ADVANCED DIRECTIVE: Full code    DISPOSITION: Per Primary Team 31F PMH of PCOS, Gestational DM, Hypothyroidism presents with Elevated Blood Pressure + HA post partum      Postpartum preeclampsia  - Patient's BP elevated on admission x 1, s/p hydralazine and normal BP since  - s/p Mag 2++ gtt  - Patient now asymptomatic and blood pressures controlled without intervention  - Patient with prior hx of Incomplete RBBB in 2020, when she had hernia surgery. EKG on this admission with similar findings: Mildly prolonged QTc. Avoid QTc prolonging agents   - No contraindication for discharge with close outpatient follow up.   - Continue pre-eclampsia management as per OB/GYN  - Cardiology input appreciated    - Recommend Patient to check BP at home 2x day and to keep a journal should to be reviewed by PCP/Cards   - Low salt diet   - Bowel regimen  - Outpatient follow up with PCP    Hypothyroidism  - Continue Synthroid, recommend f/u with PCP/endo after dc    PCOS/Gestational DM  - continue PTA meds    VTE PPx: SCDs    ADVANCED DIRECTIVE: Full code    DISPOSITION: Per Primary Team

## 2022-02-03 NOTE — PROGRESS NOTE ADULT - SUBJECTIVE AND OBJECTIVE BOX
CHIEF COMPLAINT: HA, High BP    HPI: 31F with PMH of PCOS, Gestational DM, and Hypothyroidism presents with elevated Blood pressure and HA and non-specific chest pain. Patient had uncomplicated delivery of baby 8 days prior to admission. Medicine consulted to evalute EKG changes + elevated Blood pressure. On presentation BP now back to normal not requiring further Rx. Denies fever, chills, chest pain, HA, nausea, vomiting.     Interval History: 2/3/22 Pt seen and examined, s/o at bedside. Reports feeling much better, chest pressure, HA, and LE swelling have resolved. + flatus, moving bowels. Pain well controlled. Eager to get home. Denies CP, palps, SOB, RIVERA, HA, Dizziness, n/v/d. All other review of systems is negative unless indicated above    PHYSICAL EXAM:  Vital Signs Last 24 Hrs  T(C): 36.7 (03 Feb 2022 09:04), Max: 36.7 (02 Feb 2022 23:43)  T(F): 98.1 (03 Feb 2022 09:04), Max: 98.1 (03 Feb 2022 09:04)  HR: 66 (03 Feb 2022 09:04) (58 - 82)  BP: 131/79 (03 Feb 2022 09:04) (108/71 - 131/79)  RR: 16 (03 Feb 2022 09:04) (16 - 16)  SpO2: 98% (03 Feb 2022 09:04) (97% - 98%) on room air  Constitutional: NAD, awake and alert, sitting in chair  HEENT: PERR, EOMI  Neck: Soft and supple,  No JVD  Respiratory: Breath sounds are clear bilaterally, No wheezing, rales or rhonchi  Cardiovascular: S1 and S2, regular rate and rhythm, no Murmurs  Gastrointestinal: Bowel Sounds present, soft, nontender, nondistended  Extremities: No peripheral edema  Vascular: 2+ peripheral pulses  Neurological: A/O x 3, no focal deficits  Musculoskeletal: 5/5 strength b/l upper and lower extremities  Skin: No rashes    LABS: All Labs Reviewed:                        9.6    10.00 )-----------( 222      ( 01 Feb 2022 18:56 )             29.1     02-01    141  |  109<H>  |  20  ----------------------------<  99  3.7   |  28  |  1.00    Ca    9.1      01 Feb 2022 18:56  Mg     6.0     02-02    TPro  6.2  /  Alb  2.5<L>  /  TBili  0.2  /  DBili  x   /  AST  27  /  ALT  72  /  AlkPhos  99  02-01    PT/INR - ( 01 Feb 2022 18:56 )   PT: 11.3 sec;   INR: 0.97 ratio    PTT - ( 01 Feb 2022 18:56 )  PTT:31.6 sec  Fibrinogen Assay: 586  Lactate Dehydrogenase, Serum (02.01.22 @ 18:56)   Lactate Dehydrogenase, Serum: 229 U/L Uric Acid, Serum (02.01.22 @ 18:56)   Uric Acid, Serum: 5.8 mg/dL   MICRO:  Treponema Pallidum Antibody Interpretation: Negative    RADIOLOGY/EKG/Additional Testing: all reviewed    MEDICATIONS:  MEDICATIONS  (STANDING):  lactated ringers. 1000 milliLiter(s) (80 mL/Hr) IV Continuous <Continuous>  lactated ringers. 1000 milliLiter(s) (75 mL/Hr) IV Continuous <Continuous>  levothyroxine 50 MICROGram(s) Oral daily  magnesium sulfate Infusion 1.5 Gm/Hr (37.5 mL/Hr) IV Continuous <Continuous>  melatonin 5 milliGRAM(s) Oral at bedtime  metFORMIN 500 milliGRAM(s) Oral every 12 hours  prenatal multivitamin 1 Tablet(s) Oral daily    MEDICATIONS  (PRN):  acetaminophen     Tablet .. 650 milliGRAM(s) Oral every 6 hours PRN Moderate Pain (4 - 6)  ibuprofen  Tablet. 600 milliGRAM(s) Oral every 6 hours PRN Mild Pain (1 - 3), Moderate Pain (4 - 6)         CHIEF COMPLAINT: HA, Elevated BP    HPI: 31F with PMH of PCOS, Gestational DM, and Hypothyroidism presents with elevated Blood pressure and HA and non-specific chest pain. Patient had uncomplicated delivery of baby 8 days prior to admission. Medicine consulted to evalute EKG changes + elevated Blood pressure. On presentation BP now back to normal not requiring further Rx. Denies fever, chills, chest pain, HA, nausea, vomiting.     Interval History: 2/3/22 Pt seen and examined, s/o at bedside. Reports feeling much better, chest pressure, HA, and LE swelling have all resolved. + Flatus, moving bowels. Pain well controlled. Eager to get home. Denies CP, palps, SOB, RIVERA, HA, Dizziness, n/v/d. All other review of systems is negative unless indicated above.    PHYSICAL EXAM:  Vital Signs Last 24 Hrs  T(C): 36.7 (03 Feb 2022 09:04), Max: 36.7 (02 Feb 2022 23:43)  T(F): 98.1 (03 Feb 2022 09:04), Max: 98.1 (03 Feb 2022 09:04)  HR: 66 (03 Feb 2022 09:04) (58 - 82)  BP: 131/79 (03 Feb 2022 09:04) (108/71 - 131/79)  RR: 16 (03 Feb 2022 09:04) (16 - 16)  SpO2: 98% (03 Feb 2022 09:04) (97% - 98%) on room air  Constitutional: NAD, awake and alert, sitting in chair  HEENT: PERR, EOMI  Neck: Soft and supple,  No JVD  Respiratory: Breath sounds are clear bilaterally, No wheezing, rales or rhonchi  Cardiovascular: S1 and S2, regular rate and rhythm, no Murmurs  Gastrointestinal: Bowel Sounds present, soft, nontender, nondistended  Extremities: No peripheral edema  Vascular: 2+ peripheral pulses  Neurological: A/O x 3, no focal deficits  Musculoskeletal: 5/5 strength b/l upper and lower extremities  Skin: No rashes    LABS: All Labs Reviewed:                        9.6    10.00 )-----------( 222      ( 01 Feb 2022 18:56 )             29.1     02-01    141  |  109<H>  |  20  ----------------------------<  99  3.7   |  28  |  1.00    Ca    9.1      01 Feb 2022 18:56  Mg     6.0     02-02    TPro  6.2  /  Alb  2.5<L>  /  TBili  0.2  /  DBili  x   /  AST  27  /  ALT  72  /  AlkPhos  99  02-01    PT/INR - ( 01 Feb 2022 18:56 )   PT: 11.3 sec;   INR: 0.97 ratio    PTT - ( 01 Feb 2022 18:56 )  PTT:31.6 sec  Fibrinogen Assay: 586  2/1/22: Lactate Dehydrogenase, Serum: 229 U/L   2/1/22: Uric Acid, Serum: 5.8 mg/dL   MICRO:  Treponema Pallidum Antibody Interpretation: Negative    RADIOLOGY/EKG/Additional Testing: all reviewed  12 Lead ECG (02.02.22 @ 07:54)   Normal sinus rhythm  Incomplete right bundle branch block  Nonspecific ST abnormality  Prolonged QT  Abnormal ECG  When compared with ECG of 01-FEB-2022 22:22,  No significant change was found    MEDICATIONS:  MEDICATIONS  (STANDING):  lactated ringers. 1000 milliLiter(s) (80 mL/Hr) IV Continuous <Continuous>  lactated ringers. 1000 milliLiter(s) (75 mL/Hr) IV Continuous <Continuous>  levothyroxine 50 MICROGram(s) Oral daily  magnesium sulfate Infusion 1.5 Gm/Hr (37.5 mL/Hr) IV Continuous <Continuous>  melatonin 5 milliGRAM(s) Oral at bedtime  metFORMIN 500 milliGRAM(s) Oral every 12 hours  prenatal multivitamin 1 Tablet(s) Oral daily    MEDICATIONS  (PRN):  acetaminophen     Tablet .. 650 milliGRAM(s) Oral every 6 hours PRN Moderate Pain (4 - 6)  ibuprofen  Tablet. 600 milliGRAM(s) Oral every 6 hours PRN Mild Pain (1 - 3), Moderate Pain (4 - 6)

## 2022-02-03 NOTE — PROGRESS NOTE ADULT - SUBJECTIVE AND OBJECTIVE BOX
PPD #8    S: Patient doing well. Minimal lochia. No complaints. No symptoms of PET    O: Vital Signs Last 24 Hrs  T(C): 36.6 (2022 05:30), Max: 36.7 (2022 23:43)  T(F): 97.8 (2022 05:30), Max: 98 (2022 23:43)  HR: 60 (2022 05:30) (58 - 82)  BP: 108/71 (2022 05:30) (106/64 - 120/81)  BP(mean): --  RR: 16 (2022 05:30) (16 - 16)  SpO2: 98% (2022 05:30) (97% - 98%)    Gen: NAD  Abd: soft, NT, ND, fundus firm below umbilicus  Ext: no tenderness  Perineum: intact    Labs:                        9.6    10.00 )-----------( 222      ( 2022 18:56 )             29.1           A: 31y PPD# 8 s/p  and re-admitted for PET -- BPs stable off Mg++    Doing well --- seen by cardiology and no acute problems noted    Plan:  Discharge home.  vaginal restrictions only   Follow up approx one week for post partum visit.  Call office for any fevers, chills, heavy vaginal bleeding, symptoms of depression, or any other concerning symptoms of PET  Analgesia as directed

## 2022-02-07 DIAGNOSIS — E03.9 HYPOTHYROIDISM, UNSPECIFIED: ICD-10-CM

## 2022-02-07 DIAGNOSIS — E28.2 POLYCYSTIC OVARIAN SYNDROME: ICD-10-CM

## 2022-02-07 DIAGNOSIS — R51.9 HEADACHE, UNSPECIFIED: ICD-10-CM

## 2022-02-07 DIAGNOSIS — K58.9 IRRITABLE BOWEL SYNDROME WITHOUT DIARRHEA: ICD-10-CM

## 2022-02-07 DIAGNOSIS — Z88.0 ALLERGY STATUS TO PENICILLIN: ICD-10-CM

## 2022-04-13 ENCOUNTER — NON-APPOINTMENT (OUTPATIENT)
Age: 32
End: 2022-04-13

## 2022-04-13 PROBLEM — E03.9 HYPOTHYROIDISM, UNSPECIFIED: Chronic | Status: ACTIVE | Noted: 2022-02-01

## 2022-04-20 ENCOUNTER — APPOINTMENT (OUTPATIENT)
Dept: ORTHOPEDIC SURGERY | Facility: CLINIC | Age: 32
End: 2022-04-20
Payer: COMMERCIAL

## 2022-04-20 VITALS
HEIGHT: 67 IN | HEART RATE: 76 BPM | WEIGHT: 197 LBS | BODY MASS INDEX: 30.92 KG/M2 | SYSTOLIC BLOOD PRESSURE: 96 MMHG | DIASTOLIC BLOOD PRESSURE: 61 MMHG

## 2022-04-20 DIAGNOSIS — M75.31 CALCIFIC TENDINITIS OF RIGHT SHOULDER: ICD-10-CM

## 2022-04-20 PROCEDURE — 73030 X-RAY EXAM OF SHOULDER: CPT | Mod: RT

## 2022-04-20 PROCEDURE — 99203 OFFICE O/P NEW LOW 30 MIN: CPT

## 2022-04-20 NOTE — PROCEDURE
[FreeTextEntry1] : Injection: Right Shoulder \par Indication: Calcific tendonitis\par \par A discussion was had with the patient regarding this procedure and all questions were answered. All risks, benefits and alternatives were discussed. These included but were not limited to bleeding, infection, and allergic reaction. Alcohol was used to clean the skin, and betadine was used to sterilize and prep the area in the posterior aspect of the right shoulder. Ethyl chloride spray was then used as a topical anesthetic. A 21-gauge needle was used to inject 4cc of 1% lidocaine and 1cc of 40mg/ml methylprednisolone into the right subacromial space. A sterile bandage was then applied. The patient tolerated the procedure well and there were no complications.\par

## 2022-04-20 NOTE — PHYSICAL EXAM
[de-identified] : Right shoulder exam\par Inspection: No malalignment, No defects\par Skin: No masses, No lesions\par Neck: Negative Spurlings, full ROM without pain\par ROM: Full range of motion of the bilateral shoulders, pain at the extremes of motion of the right shoulder.\par Painful arc ROM: None\par Tenderness: No bicipital tenderness, no tenderness to the greater tuberosity/RTC insertion, no anterior shoulder/lesser tuberosity tenderness\par Strength: 5/5 ER, 5/5 IR in adduction, 5/5 supraspinatus testing\par AC Joint: No ttp, no pain with cross arm testing\par Biceps: Speed negative\par Impingement test: Positive Pierre\par Stability: Negative apprehension, negative anterior/posterior load and shift\par Vasc: 2+ radial pulse\par Neuro: AIN, PIN, Ulnar nerve in tact to motor\par Sensation: In tact to light touch throughout\par  [de-identified] : The following radiographs were ordered and read by me during this patients visit. I reviewed each radiograph in detail with the patient and discussed my findings as highlighted below. \par \par 3 x-ray views of the right shoulder demonstrate a small lucency subacromial space, consistent with a calcific tendonitis. There is no fracture, dislocation. There is no degenerative change seen. There is no malalignment. No obvious osseous abnormality.

## 2022-04-20 NOTE — ASSESSMENT
[FreeTextEntry1] : 31 year female presents with right shoulder pain. Exam findings consistent with calcific tendonitis. \par \par Nature and history of the condition was discussed at length. Risks and benefits of conservative observation and treatment versus subacromial cortisone injection were discussed in detail. Cortisone injection was performed in office today, patient tolerated procedure well with no complications. Patient to schedule follow up appointment if symptoms persist. \par \par All questions and concerns have been addressed, and the patient is in agreement with the above plan.

## 2022-04-20 NOTE — ADDENDUM
[FreeTextEntry1] : INidhi assisted with documentation on 04/20/2022  acting as scribe for Dr. Charis Snell.

## 2022-04-20 NOTE — HISTORY OF PRESENT ILLNESS
[FreeTextEntry1] : 31 year female presents for evaluation of right shoulder pain.  Patient reports a prior history of calcific tendinitis about 7 years ago status post aspiration lavage with good results.  She reports over the last month she has developed pain similar to pain she had experienced secondary to the calcific tendinitis years ago, without significant injury or inciting event.  She notes the pain is present with overhead activity as well as sleeping on the right shoulder.  She presents for options for treatment.

## 2022-04-27 NOTE — OB POSTPARTUM TRIAGE NOTE - PRO PRENATAL LABS ORI SOURCE ANTIBODY SCREEN
hard copy, drawn during this pregnancy Nsaids Pregnancy And Lactation Text: These medications are considered safe up to 30 weeks gestation. It is excreted in breast milk.

## 2022-05-18 ENCOUNTER — APPOINTMENT (OUTPATIENT)
Dept: ORTHOPEDIC SURGERY | Facility: CLINIC | Age: 32
End: 2022-05-18

## 2022-07-07 ENCOUNTER — APPOINTMENT (OUTPATIENT)
Dept: HEMATOLOGY ONCOLOGY | Facility: CLINIC | Age: 32
End: 2022-07-07

## 2022-07-07 ENCOUNTER — OUTPATIENT (OUTPATIENT)
Dept: OUTPATIENT SERVICES | Facility: HOSPITAL | Age: 32
LOS: 1 days | Discharge: HOME | End: 2022-07-07

## 2022-07-07 VITALS
DIASTOLIC BLOOD PRESSURE: 61 MMHG | SYSTOLIC BLOOD PRESSURE: 103 MMHG | TEMPERATURE: 98.4 F | RESPIRATION RATE: 18 BRPM | HEART RATE: 102 BPM | WEIGHT: 198 LBS | OXYGEN SATURATION: 98 % | BODY MASS INDEX: 31.01 KG/M2

## 2022-07-07 DIAGNOSIS — Z87.19 PERSONAL HISTORY OF OTHER DISEASES OF THE DIGESTIVE SYSTEM: ICD-10-CM

## 2022-07-07 DIAGNOSIS — N94.6 DYSMENORRHEA, UNSPECIFIED: ICD-10-CM

## 2022-07-07 DIAGNOSIS — G43.119 MIGRAINE WITH AURA, INTRACTABLE, W/OUT STATUS MIGRAINOSUS: ICD-10-CM

## 2022-07-07 DIAGNOSIS — Z80.7 FAMILY HISTORY OF OTHER MALIGNANT NEOPLASMS OF LYMPHOID, HEMATOPOIETIC AND RELATED TISSUES: ICD-10-CM

## 2022-07-07 DIAGNOSIS — G43.019 MIGRAINE W/OUT AURA, INTRACTABLE, W/OUT STATUS MIGRAINOSUS: ICD-10-CM

## 2022-07-07 DIAGNOSIS — E03.9 HYPOTHYROIDISM, UNSPECIFIED: ICD-10-CM

## 2022-07-07 DIAGNOSIS — R51.9 HEADACHE, UNSPECIFIED: ICD-10-CM

## 2022-07-07 DIAGNOSIS — R90.89 OTHER ABNORMAL FINDINGS ON DIAGNOSTIC IMAGING OF CENTRAL NERVOUS SYSTEM: ICD-10-CM

## 2022-07-07 DIAGNOSIS — Z01.818 ENCOUNTER FOR OTHER PREPROCEDURAL EXAMINATION: ICD-10-CM

## 2022-07-07 DIAGNOSIS — D50.9 IRON DEFICIENCY ANEMIA, UNSPECIFIED: ICD-10-CM

## 2022-07-07 PROCEDURE — 99203 OFFICE O/P NEW LOW 30 MIN: CPT

## 2022-07-07 RX ORDER — LEVOTHYROXINE SODIUM 0.03 MG/1
25 TABLET ORAL
Qty: 90 | Refills: 0 | Status: ACTIVE | COMMUNITY
Start: 2022-03-08

## 2022-07-07 RX ORDER — ESCITALOPRAM OXALATE 10 MG/1
10 TABLET ORAL
Qty: 90 | Refills: 0 | Status: DISCONTINUED | COMMUNITY
Start: 2020-06-09 | End: 2022-07-07

## 2022-07-07 RX ORDER — METFORMIN HYDROCHLORIDE 500 MG/1
500 TABLET, COATED ORAL TWICE DAILY
Refills: 0 | Status: ACTIVE | COMMUNITY
Start: 2022-07-01

## 2022-07-07 RX ORDER — SUMATRIPTAN 100 MG/1
100 TABLET, FILM COATED ORAL
Qty: 1 | Refills: 2 | Status: DISCONTINUED | COMMUNITY
Start: 2017-01-09 | End: 2022-07-07

## 2022-07-07 RX ORDER — TRANEXAMIC ACID 650 MG/1
650 TABLET ORAL
Qty: 30 | Refills: 4 | Status: DISCONTINUED | COMMUNITY
Start: 2020-07-27 | End: 2022-07-07

## 2022-07-08 PROBLEM — N94.6 DYSMENORRHEA: Status: ACTIVE | Noted: 2020-07-27

## 2022-07-08 PROBLEM — D50.9 IRON DEFICIENCY ANEMIA: Status: ACTIVE | Noted: 2022-07-08

## 2022-07-08 RX ORDER — MULTIVIT-MIN/IRON/FOLIC ACID/K 18-600-40
CAPSULE ORAL
Refills: 0 | Status: ACTIVE | COMMUNITY

## 2022-07-08 RX ORDER — GLUC/MSM/COLGN2/HYAL/ANTIARTH3 375-375-20
TABLET ORAL
Refills: 0 | Status: ACTIVE | COMMUNITY

## 2022-07-08 RX ORDER — CHROMIUM 200 MCG
TABLET ORAL
Refills: 0 | Status: ACTIVE | COMMUNITY

## 2022-07-08 RX ORDER — PNV NO.95/FERROUS FUM/FOLIC AC 28MG-0.8MG
TABLET ORAL
Refills: 0 | Status: ACTIVE | COMMUNITY

## 2022-07-27 NOTE — HISTORY OF PRESENT ILLNESS
[de-identified] : LOIDA MA is a 32 y.o. with a PMH significant for Hypothyroidism, PCOS,  2022, admission for postpartum preeclampsia 2022, who was referred to our office for an evaluation of anemia. \par \par 22 - WBC: 7.8,    Hgb: 11.5,  Hct: 34.2,  MCV: 88,     Plts: 178,  B12: 398.  Folate: >20,  Ferritin: 26,  TSAT: 10%.\par 22 - WBC: 10.0,  Hgb: 9.6,    Hct: 29.1,  MCV: 91.5,  Plts: 222,   .  \par 22 -                     Hgb: 8.7,    Hct: 26.8,   \par 22 - WBC: 11.1,  Hgb: 11.6,   Hct: 35.1,  MCV: 91.4,  Plts: 147 -> gave birth 22.  \par \par Patient reports that she has had very heavy periods.  Since giving birth her periods have returned, but so far have been normal.  She does not recall being told she was anemic before this.  She was instructed to start on PO iron ~ 1st week in .  So far she is tolerating it well.  \par She denies having pica but she states she had a LOT of hair thinning post partum.  She does have fatigue, but attributes this to being a new mother.  Has noted some mild RLS.  \par She admits to feeling better since starting on PO iron and her hair loss has stopped. \par \par Patient reports she had labs repeated ~ 1 week ago by her endocrinologist Dr. Cornell ([facility]) and H/H was improved. \par \par

## 2022-07-27 NOTE — REVIEW OF SYSTEMS
[Patient Intake Form Reviewed] : Patient intake form was reviewed [Fatigue] : fatigue [Negative] : Allergic/Immunologic [FreeTextEntry3] : notes blurry vision if gets dehydrated [de-identified] : had hair thinning post partum

## 2022-07-27 NOTE — PHYSICAL EXAM
[Normal] : affect appropriate [de-identified] : anicteric [de-identified] : no c/c/e [de-identified] : no rashes

## 2022-07-27 NOTE — ASSESSMENT
[FreeTextEntry1] : Patient is a 32 y.o. with a hx of heavy periods and then an BERT after delivery of her 1st child. \par Started on PO iron ~ 1 month ago - tolerating well and clinically feels better.\par Patient reports she had labs repeated ~ 1 week ago by her endocrinologist Dr. Cornell ([facility]) and H/H was improved - will request copy for our records. \par Reviewed difference between PO and IV iron.  Advantages of IV iron - works fast but small risk of serious allergic reaction.  PO iron works slower but can cause GI distress. \par Since she is tolerating PO iron well and her anemia is not severe recommended she continue to take PO iron.  Explained works best if taken on an empty stomach with Vitamin C.  If cannot tolerate this way then OK to take with food.   Informed may turn stool black and can be constipating - may need to add stool softener.  \par Need to repeat labs in ~ 3 - 4 months to make sure absorbing.\par All questions answered. \par Case was reviewed with Dr. Mantilla - she agrees with the above plan. \par \par Aziza Tavera (PCP)\par Aleksander Sanchez (GYN)\par  Dr. Cornell ([facility])

## 2022-08-01 DIAGNOSIS — D64.9 ANEMIA, UNSPECIFIED: ICD-10-CM

## 2022-08-15 NOTE — PATIENT PROFILE OB - FALL HARM RISK - UNIVERSAL INTERVENTIONS
Linda Mendez)
Bed in lowest position, wheels locked, appropriate side rails in place/Call bell, personal items and telephone in reach/Instruct patient to call for assistance before getting out of bed or chair/Non-slip footwear when patient is out of bed/[place] to call system/Physically safe environment - no spills, clutter or unnecessary equipment/Purposeful Proactive Rounding/Room/bathroom lighting operational, light cord in reach

## 2022-09-17 ENCOUNTER — NON-APPOINTMENT (OUTPATIENT)
Age: 32
End: 2022-09-17

## 2023-06-22 ENCOUNTER — APPOINTMENT (OUTPATIENT)
Dept: OBGYN | Facility: CLINIC | Age: 33
End: 2023-06-22
Payer: COMMERCIAL

## 2023-06-22 VITALS — DIASTOLIC BLOOD PRESSURE: 75 MMHG | SYSTOLIC BLOOD PRESSURE: 115 MMHG

## 2023-06-22 DIAGNOSIS — Z00.00 ENCOUNTER FOR GENERAL ADULT MEDICAL EXAMINATION W/OUT ABNORMAL FINDINGS: ICD-10-CM

## 2023-06-22 PROCEDURE — 99212 OFFICE O/P EST SF 10 MIN: CPT

## 2023-06-22 NOTE — PLAN
[FreeTextEntry1] : \par \par No vaginal or breast exam performed today\par Discussed OCPs, nuva ring, IUD, Nexplanon in detail. \par She desires the paraguard IUD. \par She is due for her annual in August & has her appt scheduled. \par discussed obtaining normal pap & negative culture prior to IUD insertion. \par All questions answered; pt agreeable with plan. \par

## 2023-06-29 ENCOUNTER — APPOINTMENT (OUTPATIENT)
Dept: OBGYN | Facility: CLINIC | Age: 33
End: 2023-06-29
Payer: COMMERCIAL

## 2023-06-29 ENCOUNTER — LABORATORY RESULT (OUTPATIENT)
Age: 33
End: 2023-06-29

## 2023-06-29 VITALS
SYSTOLIC BLOOD PRESSURE: 116 MMHG | HEIGHT: 67 IN | HEART RATE: 77 BPM | BODY MASS INDEX: 28.25 KG/M2 | DIASTOLIC BLOOD PRESSURE: 75 MMHG | WEIGHT: 180 LBS

## 2023-06-29 LAB
BILIRUB UR QL STRIP: NORMAL
CLARITY UR: CLEAR
COLLECTION METHOD: NORMAL
GLUCOSE UR-MCNC: NORMAL
HCG UR QL: 0.2 EU/DL
HEMOGLOBIN: 10.9
HGB UR QL STRIP.AUTO: NORMAL
KETONES UR-MCNC: NORMAL
LEUKOCYTE ESTERASE UR QL STRIP: NORMAL
NITRITE UR QL STRIP: NORMAL
PH UR STRIP: 5.5
PROT UR STRIP-MCNC: NORMAL
SP GR UR STRIP: 1.02

## 2023-06-29 PROCEDURE — 85018 HEMOGLOBIN: CPT | Mod: QW

## 2023-06-29 PROCEDURE — 81003 URINALYSIS AUTO W/O SCOPE: CPT | Mod: QW

## 2023-06-29 PROCEDURE — 99395 PREV VISIT EST AGE 18-39: CPT

## 2023-06-29 NOTE — PLAN
[FreeTextEntry1] : \par \par Patient to follow up in 1 year for annual GYN exam\par Mammogram and bilateral breast US due:  40\par Colonoscopy due:  45 \par Bone density due:  pm\par \par After pap results patient to call and schedule IUD insertion. all of her questions were answered she is agreeable with plan \par \par \par Pap ordered\par All questions answered, patient agreeable with plan.\par

## 2023-06-29 NOTE — HISTORY OF PRESENT ILLNESS
[TextBox_4] : patient is a 34 yo female here today for annual visit. She denies any GYN complaints at this time. periods are regular, patient recently  she desires Paragard IUD. \par \par maternal grandmother hx of ovarian and breast cancer\par mother BRCA neg

## 2023-07-04 LAB — CYTOLOGY CVX/VAG DOC THIN PREP: ABNORMAL

## 2023-07-17 ENCOUNTER — NON-APPOINTMENT (OUTPATIENT)
Age: 33
End: 2023-07-17

## 2023-07-24 ENCOUNTER — NON-APPOINTMENT (OUTPATIENT)
Age: 33
End: 2023-07-24

## 2023-08-17 ENCOUNTER — APPOINTMENT (OUTPATIENT)
Dept: OBGYN | Facility: CLINIC | Age: 33
End: 2023-08-17

## 2023-09-12 ENCOUNTER — NON-APPOINTMENT (OUTPATIENT)
Age: 33
End: 2023-09-12

## 2023-10-30 NOTE — ASU PATIENT PROFILE, ADULT - HEALTHCARE QUESTIONS, PROFILE
"10/30/2023       RE: Dorothy Walker  [address]  [place] [address]     Dear Colleague,    Thank you for referring your patient, Dorothy Walker, to the [facility]. Please see a copy of my visit note below.    [facility]    HPI: Dorothy Walker is a 32 year old G0 who presents to clinic today to discuss recent positive high risk HPV result and options for recommended procedures. She expressed lack of education and expectations around previous LEEP and colposcopies. States she has concern for pain and anxiety around future procedure. If she were to have another procedure in the future she is wondering about meds for anxiety. Previously had cramping after colposcopy but didn't take any premeds.    . No new partners.  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     Objective:   /76   Pulse 83   Wt 57.2 kg (126 lb)   LMP 09/10/2023   BMI 23.05 kg/m    General: Healthy appearing. Alert, oriented. Affect is appropriate. Patient is interactive with providers and asking appropriate questions.    Assessment/Plan:   Dorothy Walker is a 32 year old G0 female with a history of abnormal pap smears and recent HR HPV + on her last pap on 9/22/23. Discussed patient's previous experiences with procedures and acknowledged the significant anxiety " around future procedures. Given her history of ASCUS and LEEP in 2018 showed only a small foci of low grade dysplasia with subsequent negative HPV testing, it is not clear what her risk would be. ASCCP guidance states to individualize recommendations taking her entire history into account.   - ASCCP recs:    - with last pap only: 1 year follow-up, risk 5 year risk of CIN3+ 4.8%   - Last 2 paps: 1 year follow-up, risk of CIN3+ is 2.4%   - Last 3 paps: 1 year follow-up   - If LEEP had shown CIN3: colposcopy with risk of CIN3+ 5.8%     Reviewed the ASCCP risk of CIN3+ with her and discussed option to do a repeat pap in one year (09/2024) and if it comes back HPV + then we could pursue a colposcopy at that time.     Provided patient with ACOG resources and knows to follow up if she starts to have additional symptoms. If colpo is needed offered anxiolytic prior.     Received her HPV vaccine in the 2000s in [place].     Patient seen and discussed with Dr. Vega.     Crystal Lowry, MS3  [employer].     I was present with the medical student who participated in the service and in the documentation of the note. I have verified the history and personally performed the physical exam and medical decision making. I agree with the assessment and plan of care as documented in the note.    Jeanine Vega MD   none

## 2023-11-26 ENCOUNTER — NON-APPOINTMENT (OUTPATIENT)
Age: 33
End: 2023-11-26

## 2023-11-28 ENCOUNTER — APPOINTMENT (OUTPATIENT)
Dept: OBGYN | Facility: CLINIC | Age: 33
End: 2023-11-28
Payer: COMMERCIAL

## 2023-11-28 PROCEDURE — 99213 OFFICE O/P EST LOW 20 MIN: CPT

## 2023-11-28 RX ORDER — METRONIDAZOLE 500 MG/1
500 TABLET ORAL TWICE DAILY
Qty: 14 | Refills: 0 | Status: ACTIVE | COMMUNITY
Start: 2023-11-28 | End: 1900-01-01

## 2023-12-04 LAB
A VAGINAE DNA VAG QL NAA+PROBE: ABNORMAL
BVAB2 DNA VAG QL NAA+PROBE: ABNORMAL
C KRUSEI DNA VAG QL NAA+PROBE: NEGATIVE
C TRACH RRNA SPEC QL NAA+PROBE: NEGATIVE
CANDIDA DNA VAG QL NAA+PROBE: NEGATIVE
MEGA1 DNA VAG QL NAA+PROBE: ABNORMAL
N GONORRHOEA RRNA SPEC QL NAA+PROBE: NEGATIVE
T VAGINALIS RRNA SPEC QL NAA+PROBE: NEGATIVE

## 2024-01-26 ENCOUNTER — NON-APPOINTMENT (OUTPATIENT)
Age: 34
End: 2024-01-26

## 2024-02-13 ENCOUNTER — NON-APPOINTMENT (OUTPATIENT)
Age: 34
End: 2024-02-13

## 2024-03-05 ENCOUNTER — NON-APPOINTMENT (OUTPATIENT)
Age: 34
End: 2024-03-05

## 2024-06-20 ENCOUNTER — APPOINTMENT (OUTPATIENT)
Dept: OBGYN | Facility: CLINIC | Age: 34
End: 2024-06-20
Payer: COMMERCIAL

## 2024-06-20 DIAGNOSIS — N93.0 POSTCOITAL AND CONTACT BLEEDING: ICD-10-CM

## 2024-06-20 DIAGNOSIS — N94.12 DEEP DYSPAREUNIA: ICD-10-CM

## 2024-06-20 LAB — HEMOGLOBIN: 11.8

## 2024-06-20 PROCEDURE — 85018 HEMOGLOBIN: CPT | Mod: QW

## 2024-06-20 PROCEDURE — 99213 OFFICE O/P EST LOW 20 MIN: CPT

## 2024-06-20 NOTE — CHIEF COMPLAINT
[Urgent Visit] : Urgent Visit [FreeTextEntry1] : Patient is a 35 yo female here today for postcoital bleeding and deep pain with intercourse. She states this has only occurred one time since new partner about 1 year ago.  She denies any other complaints at this time, she is due for annual visit

## 2024-06-20 NOTE — DISCUSSION/SUMMARY
[FreeTextEntry1] :  No cervical polyp noted. will do pelvic sonogram and patient to follow up for her annual visit this is likely positional and due to size.  Will ensure pap smear is normal.  patient to monitor her bleeding and call me if it is heavy. will consider pelvic floor pt is dyspareunia does not resolve.  advised lubricant.

## 2024-07-03 ENCOUNTER — NON-APPOINTMENT (OUTPATIENT)
Age: 34
End: 2024-07-03

## 2024-08-13 PROBLEM — Z12.4 CERVICAL CANCER SCREENING: Status: ACTIVE | Noted: 2024-08-13

## 2024-08-20 ENCOUNTER — APPOINTMENT (OUTPATIENT)
Dept: OBGYN | Facility: CLINIC | Age: 34
End: 2024-08-20
Payer: COMMERCIAL

## 2024-08-20 ENCOUNTER — ASOB RESULT (OUTPATIENT)
Age: 34
End: 2024-08-20

## 2024-08-20 ENCOUNTER — APPOINTMENT (OUTPATIENT)
Dept: ANTEPARTUM | Facility: CLINIC | Age: 34
End: 2024-08-20
Payer: COMMERCIAL

## 2024-08-20 VITALS
SYSTOLIC BLOOD PRESSURE: 95 MMHG | HEART RATE: 78 BPM | DIASTOLIC BLOOD PRESSURE: 63 MMHG | WEIGHT: 170 LBS | HEIGHT: 67 IN | BODY MASS INDEX: 26.68 KG/M2

## 2024-08-20 DIAGNOSIS — N94.12 DEEP DYSPAREUNIA: ICD-10-CM

## 2024-08-20 DIAGNOSIS — Z31.69 ENCOUNTER FOR OTHER GENERAL COUNSELING AND ADVICE ON PROCREATION: ICD-10-CM

## 2024-08-20 DIAGNOSIS — Z12.4 ENCOUNTER FOR SCREENING FOR MALIGNANT NEOPLASM OF CERVIX: ICD-10-CM

## 2024-08-20 DIAGNOSIS — Z00.00 ENCOUNTER FOR GENERAL ADULT MEDICAL EXAMINATION W/OUT ABNORMAL FINDINGS: ICD-10-CM

## 2024-08-20 DIAGNOSIS — N94.6 DYSMENORRHEA, UNSPECIFIED: ICD-10-CM

## 2024-08-20 DIAGNOSIS — Z01.419 ENCOUNTER FOR GYNECOLOGICAL EXAMINATION (GENERAL) (ROUTINE) W/OUT ABNORMAL FINDINGS: ICD-10-CM

## 2024-08-20 DIAGNOSIS — D64.9 ANEMIA, UNSPECIFIED: ICD-10-CM

## 2024-08-20 LAB
BILIRUB UR QL STRIP: NEGATIVE
CLARITY UR: CLEAR
COLLECTION METHOD: NORMAL
GLUCOSE UR-MCNC: NEGATIVE
HCG UR QL: 0 EU/DL
HEMOGLOBIN: 10.4
HGB UR QL STRIP.AUTO: NEGATIVE
KETONES UR-MCNC: NEGATIVE
LEUKOCYTE ESTERASE UR QL STRIP: NORMAL
NITRITE UR QL STRIP: NEGATIVE
PH UR STRIP: 6
PROT UR STRIP-MCNC: NEGATIVE
SP GR UR STRIP: 1

## 2024-08-20 PROCEDURE — G0444 DEPRESSION SCREEN ANNUAL: CPT | Mod: 59

## 2024-08-20 PROCEDURE — 99395 PREV VISIT EST AGE 18-39: CPT

## 2024-08-20 PROCEDURE — 81003 URINALYSIS AUTO W/O SCOPE: CPT | Mod: QW

## 2024-08-20 PROCEDURE — 85018 HEMOGLOBIN: CPT | Mod: QW

## 2024-08-20 PROCEDURE — 76830 TRANSVAGINAL US NON-OB: CPT

## 2024-08-20 PROCEDURE — 76856 US EXAM PELVIC COMPLETE: CPT | Mod: 59

## 2024-08-20 NOTE — PLAN
[FreeTextEntry1] : Patient to follow up in 1 year for annual GYN exam Mammogram and bilateral breast US due:  40  Colonoscopy due: 45 Bone density due: pm   normal sonogram, she is to monitor dyspareunia, if it happens more frequently, she is to call me  Pre conceptual counseling discussed. PNV prescribed.   Pap ordered Hemoccult ordered  All questions answered, patient agreeable with plan.

## 2024-08-20 NOTE — HISTORY OF PRESENT ILLNESS
[TextBox_4] : Patient is a 35 yo female here today for annual visit and TVS follow up.  Remote history of deep dyspareunia, normal periods. Pain has been slightly better since position changes, and has not had any post coital bleeding.   Sonogram reveals normal uterus measuring 8.2 x 5.6 x 4.09cm ovaries WNL. normal follicles noted.    recently engaged. cycle tracking for pregnancy prevention

## 2024-08-21 ENCOUNTER — NON-APPOINTMENT (OUTPATIENT)
Age: 34
End: 2024-08-21

## 2024-08-21 LAB
BASOPHILS # BLD AUTO: 0.05 K/UL
BASOPHILS NFR BLD AUTO: 0.6 %
EOSINOPHIL # BLD AUTO: 0.25 K/UL
EOSINOPHIL NFR BLD AUTO: 3.1 %
HCT VFR BLD CALC: 36.2 %
HGB BLD-MCNC: 11.5 G/DL
IMM GRANULOCYTES NFR BLD AUTO: 0.4 %
LYMPHOCYTES # BLD AUTO: 2.67 K/UL
LYMPHOCYTES NFR BLD AUTO: 33.3 %
MAN DIFF?: NORMAL
MCHC RBC-ENTMCNC: 28 PG
MCHC RBC-ENTMCNC: 31.8 GM/DL
MCV RBC AUTO: 88.3 FL
MONOCYTES # BLD AUTO: 0.45 K/UL
MONOCYTES NFR BLD AUTO: 5.6 %
NEUTROPHILS # BLD AUTO: 4.58 K/UL
NEUTROPHILS NFR BLD AUTO: 57 %
PLATELET # BLD AUTO: 207 K/UL
RBC # BLD: 4.1 M/UL
RBC # FLD: 13.8 %
WBC # FLD AUTO: 8.03 K/UL

## 2024-08-21 RX ORDER — ASCORBIC ACID, CHOLECALCIFEROL, .ALPHA.-TOCOPHEROL ACETATE, DL-, PYRIDOXINE, FOLIC ACID, CYANOCOBALAMIN, CALCIUM, FERROUS FUMARATE, MAGNESIUM, DOCONEXENT 85; 200; 10; 25; 1; 12; 140; 27; 45; 300 [IU]/1; [IU]/1; [IU]/1; [IU]/1; MG/1; UG/1; MG/1; MG/1; MG/1; MG/1
27-0.6-0.4-3 CAPSULE, GELATIN COATED ORAL
Qty: 3 | Refills: 3 | Status: ACTIVE | COMMUNITY
Start: 2024-08-20

## 2024-08-27 LAB — CYTOLOGY CVX/VAG DOC THIN PREP: ABNORMAL

## 2024-10-22 ENCOUNTER — APPOINTMENT (OUTPATIENT)
Dept: OBGYN | Facility: CLINIC | Age: 34
End: 2024-10-22
Payer: COMMERCIAL

## 2024-10-22 ENCOUNTER — LABORATORY RESULT (OUTPATIENT)
Age: 34
End: 2024-10-22

## 2024-10-22 ENCOUNTER — APPOINTMENT (OUTPATIENT)
Dept: OBGYN | Facility: CLINIC | Age: 34
End: 2024-10-22

## 2024-10-22 DIAGNOSIS — Z00.00 ENCOUNTER FOR GENERAL ADULT MEDICAL EXAMINATION W/OUT ABNORMAL FINDINGS: ICD-10-CM

## 2024-10-22 DIAGNOSIS — N89.8 OTHER SPECIFIED NONINFLAMMATORY DISORDERS OF VAGINA: ICD-10-CM

## 2024-10-22 LAB
BILIRUB UR QL STRIP: NEGATIVE
CLARITY UR: CLEAR
COLLECTION METHOD: NORMAL
GLUCOSE UR-MCNC: NEGATIVE
HCG UR QL: 0 EU/DL
HGB UR QL STRIP.AUTO: NEGATIVE
KETONES UR-MCNC: NEGATIVE
LEUKOCYTE ESTERASE UR QL STRIP: NORMAL
NITRITE UR QL STRIP: NEGATIVE
PH UR STRIP: 7
PROT UR STRIP-MCNC: NEGATIVE
SP GR UR STRIP: 1

## 2024-10-22 PROCEDURE — 99213 OFFICE O/P EST LOW 20 MIN: CPT

## 2024-10-22 PROCEDURE — 81003 URINALYSIS AUTO W/O SCOPE: CPT | Mod: QW

## 2024-10-23 LAB
APPEARANCE: CLEAR
BILIRUBIN URINE: NEGATIVE
BLOOD URINE: NEGATIVE
COLOR: YELLOW
GLUCOSE QUALITATIVE U: NEGATIVE MG/DL
KETONES URINE: NEGATIVE MG/DL
LEUKOCYTE ESTERASE URINE: ABNORMAL
NITRITE URINE: NEGATIVE
PH URINE: 8
PROTEIN URINE: NEGATIVE MG/DL
SPECIFIC GRAVITY URINE: 1.01
UROBILINOGEN URINE: 0.2 MG/DL

## 2024-10-24 LAB
A VAGINAE DNA VAG QL NAA+PROBE: ABNORMAL
BACTERIA UR CULT: NORMAL
BVAB2 DNA VAG QL NAA+PROBE: ABNORMAL
C KRUSEI DNA VAG QL NAA+PROBE: NEGATIVE
C TRACH RRNA SPEC QL NAA+PROBE: NEGATIVE
CANDIDA DNA VAG QL NAA+PROBE: NEGATIVE
MEGA1 DNA VAG QL NAA+PROBE: ABNORMAL
N GONORRHOEA RRNA SPEC QL NAA+PROBE: NEGATIVE
T VAGINALIS RRNA SPEC QL NAA+PROBE: NEGATIVE

## 2024-10-24 RX ORDER — METRONIDAZOLE 7.5 MG/G
0.75 GEL VAGINAL
Qty: 1 | Refills: 0 | Status: ACTIVE | COMMUNITY
Start: 2024-10-24 | End: 1900-01-01

## 2024-10-29 ENCOUNTER — APPOINTMENT (OUTPATIENT)
Dept: UROLOGY | Facility: CLINIC | Age: 34
End: 2024-10-29
Payer: COMMERCIAL

## 2024-10-29 VITALS
HEART RATE: 100 BPM | RESPIRATION RATE: 15 BRPM | BODY MASS INDEX: 25.11 KG/M2 | OXYGEN SATURATION: 98 % | SYSTOLIC BLOOD PRESSURE: 98 MMHG | HEIGHT: 67 IN | WEIGHT: 160 LBS | DIASTOLIC BLOOD PRESSURE: 66 MMHG | TEMPERATURE: 98.1 F

## 2024-10-29 DIAGNOSIS — N34.3 URETHRAL SYNDROME, UNSPECIFIED: ICD-10-CM

## 2024-10-29 PROCEDURE — 99204 OFFICE O/P NEW MOD 45 MIN: CPT

## 2024-10-30 LAB
APPEARANCE: CLEAR
BACTERIA: ABNORMAL /HPF
BILIRUBIN URINE: NEGATIVE
BLOOD URINE: NEGATIVE
CAST: 0 /LPF
COLOR: YELLOW
EPITHELIAL CELLS: 7 /HPF
GLUCOSE QUALITATIVE U: NEGATIVE MG/DL
KETONES URINE: NEGATIVE MG/DL
LEUKOCYTE ESTERASE URINE: ABNORMAL
MICROSCOPIC-UA: NORMAL
NITRITE URINE: NEGATIVE
PH URINE: 6
PROTEIN URINE: NEGATIVE MG/DL
RED BLOOD CELLS URINE: 3 /HPF
SPECIFIC GRAVITY URINE: 1.02
UROBILINOGEN URINE: 0.2 MG/DL
WHITE BLOOD CELLS URINE: 2 /HPF

## 2024-10-31 LAB — BACTERIA UR CULT: NORMAL

## 2024-11-04 LAB — URINE CYTOLOGY: NORMAL

## 2024-11-05 ENCOUNTER — NON-APPOINTMENT (OUTPATIENT)
Age: 34
End: 2024-11-05

## 2024-11-11 ENCOUNTER — OUTPATIENT (OUTPATIENT)
Dept: OUTPATIENT SERVICES | Facility: HOSPITAL | Age: 34
LOS: 1 days | End: 2024-11-11
Payer: COMMERCIAL

## 2024-11-11 ENCOUNTER — APPOINTMENT (OUTPATIENT)
Dept: ULTRASOUND IMAGING | Facility: CLINIC | Age: 34
End: 2024-11-11
Payer: COMMERCIAL

## 2024-11-11 DIAGNOSIS — N34.3 URETHRAL SYNDROME, UNSPECIFIED: ICD-10-CM

## 2024-11-11 PROCEDURE — 76770 US EXAM ABDO BACK WALL COMP: CPT | Mod: 26

## 2024-11-11 PROCEDURE — 76770 US EXAM ABDO BACK WALL COMP: CPT

## 2024-12-03 PROBLEM — N76.0 BACTERIAL VAGINITIS: Status: ACTIVE | Noted: 2024-12-03 | Resolved: 2025-01-02

## 2024-12-10 ENCOUNTER — APPOINTMENT (OUTPATIENT)
Dept: OBGYN | Facility: CLINIC | Age: 34
End: 2024-12-10

## 2024-12-10 DIAGNOSIS — N76.0 ACUTE VAGINITIS: ICD-10-CM

## 2024-12-10 DIAGNOSIS — B96.89 ACUTE VAGINITIS: ICD-10-CM

## 2025-04-01 ENCOUNTER — NON-APPOINTMENT (OUTPATIENT)
Age: 35
End: 2025-04-01

## 2025-05-09 ENCOUNTER — APPOINTMENT (OUTPATIENT)
Dept: OBGYN | Facility: CLINIC | Age: 35
End: 2025-05-09

## 2025-08-22 ENCOUNTER — APPOINTMENT (OUTPATIENT)
Dept: OBGYN | Facility: CLINIC | Age: 35
End: 2025-08-22